# Patient Record
Sex: MALE | Race: WHITE | NOT HISPANIC OR LATINO | URBAN - METROPOLITAN AREA
[De-identification: names, ages, dates, MRNs, and addresses within clinical notes are randomized per-mention and may not be internally consistent; named-entity substitution may affect disease eponyms.]

---

## 2020-10-14 ENCOUNTER — OFFICE VISIT (OUTPATIENT)
Dept: URBAN - METROPOLITAN AREA CLINIC 63 | Facility: CLINIC | Age: 65
End: 2020-10-14

## 2021-04-05 ENCOUNTER — OFFICE VISIT (OUTPATIENT)
Dept: URBAN - METROPOLITAN AREA CLINIC 63 | Facility: CLINIC | Age: 66
End: 2021-04-05

## 2021-04-30 LAB
ABSOLUTE BASOPHILS: (no result)
ABSOLUTE EOSINOPHILS: (no result)
ABSOLUTE LYMPHOCYTES: (no result)
ABSOLUTE MONOCYTES: (no result)
ABSOLUTE NEUTROPHILS: (no result)
ALBUMIN/GLOBULIN RATIO: (no result)
ALBUMIN: (no result)
ALKALINE PHOSPHATASE: (no result)
ALT: (no result)
AST: (no result)
BASOPHILS: (no result)
BILIRUBIN, TOTAL: (no result)
BUN/CREATININE RATIO: (no result)
CALCIUM: (no result)
CARBON DIOXIDE: (no result)
CHLORIDE: (no result)
CREATININE: (no result)
EGFR AFRICAN AMERIC: (no result)
EGFR NON-AFR. AMERI: (no result)
EOSINOPHILS: (no result)
GLOBULIN: (no result)
GLUCOSE: (no result)
HEMATOCRIT: (no result)
HEMOGLOBIN: (no result)
INR: 1
LYMPHOCYTES: (no result)
MCH: (no result)
MCHC: (no result)
MCV: (no result)
MONOCYTES: (no result)
MPV: (no result)
NEUTROPHILS: (no result)
PLATELET COUNT: (no result)
POTASSIUM: (no result)
PROTEIN, TOTAL: (no result)
PT: (no result)
RDW: (no result)
RED BLOOD CELL COUNT: (no result)
SODIUM: (no result)
UREA NITROGEN (BUN): (no result)
WHITE BLOOD CELL COUNT: (no result)

## 2021-06-17 ENCOUNTER — OFFICE VISIT (OUTPATIENT)
Dept: URBAN - METROPOLITAN AREA TELEHEALTH 2 | Facility: TELEHEALTH | Age: 66
End: 2021-06-17

## 2022-03-21 ENCOUNTER — OFFICE VISIT (OUTPATIENT)
Dept: URBAN - METROPOLITAN AREA CLINIC 63 | Facility: CLINIC | Age: 67
End: 2022-03-21

## 2022-07-09 ENCOUNTER — TELEPHONE ENCOUNTER (OUTPATIENT)
Dept: URBAN - METROPOLITAN AREA CLINIC 121 | Facility: CLINIC | Age: 67
End: 2022-07-09

## 2022-07-09 RX ORDER — ALBUTEROL SULFATE 90 UG/1
AEROSOL, METERED RESPIRATORY (INHALATION) AS NEEDED
Refills: 0 | OUTPATIENT
Start: 2018-06-28 | End: 2018-09-27

## 2022-07-09 RX ORDER — ALBUTEROL SULFATE 90 UG/1
AEROSOL, METERED RESPIRATORY (INHALATION) AS NEEDED
Refills: 0 | OUTPATIENT
Start: 2017-02-23 | End: 2017-05-25

## 2022-07-09 RX ORDER — SODIUM CHLORIDE 1 G/1
TABLET ORAL
Refills: 0 | OUTPATIENT
Start: 2019-08-12 | End: 2021-04-05

## 2022-07-09 RX ORDER — ASPIRIN 81 MG/1
TABLET, DELAYED RELEASE ORAL ONCE A DAY
Refills: 0 | OUTPATIENT
Start: 2017-12-07 | End: 2018-06-28

## 2022-07-09 RX ORDER — ASPIRIN 81 MG/1
TABLET, DELAYED RELEASE ORAL ONCE A DAY
Refills: 0 | OUTPATIENT
Start: 2021-04-05 | End: 2022-03-21

## 2022-07-09 RX ORDER — SERTRALINE 100 MG/1
TABLET, FILM COATED ORAL ONCE A DAY
Refills: 0 | OUTPATIENT
Start: 2017-12-07 | End: 2018-06-28

## 2022-07-09 RX ORDER — LEVOTHYROXINE SODIUM 100 UG/1
TABLET ORAL ONCE A DAY
Refills: 0 | OUTPATIENT
Start: 2018-09-27 | End: 2019-08-12

## 2022-07-09 RX ORDER — FLUTICASONE PROPIONATE 50 UG/1
SPRAY, METERED NASAL AS NEEDED
Refills: 0 | OUTPATIENT
Start: 2017-05-25 | End: 2017-12-07

## 2022-07-09 RX ORDER — COLESTIPOL HYDROCHLORIDE 1 G/1
START ONCE A DAY- MAY INCREASE TO TWICE A DAY AFTER ONE WEEK. MUST TAKE 2 HOURS APART FROM OTHER MEDICATIONS TABLET, FILM COATED ORAL TWICE A DAY
Refills: 0 | OUTPATIENT
Start: 2021-04-05 | End: 2021-04-06

## 2022-07-09 RX ORDER — CALCIUM NO.38/D3/MAG/BORON ASP 500MG/15ML
LIQUID (ML) ORAL ONCE A DAY
Refills: 0 | OUTPATIENT
Start: 2017-05-25 | End: 2017-12-07

## 2022-07-09 RX ORDER — LOPERAMIDE HYDROCHLORIDE 2 MG/1
TWO TABLETS WITH FIRST LOOSE BOWEL MOVEMENT THEN 1 TABLET WITH EACH ADDITIONAL LOOSE BOWEL MOVEMENT CAPSULE ORAL
Refills: 1 | OUTPATIENT
Start: 2017-02-23 | End: 2018-09-27

## 2022-07-09 RX ORDER — AVOBENZONE, HOMOSALATE, OCTISALATE, OCTOCRYLENE 30; 100; 50; 100 MG/ML; MG/ML; MG/ML; MG/ML
LOTION TOPICAL ONCE A DAY
Refills: 0 | OUTPATIENT
Start: 2017-12-07 | End: 2018-06-28

## 2022-07-09 RX ORDER — ASPIRIN 81 MG/1
TABLET, DELAYED RELEASE ORAL ONCE A DAY
Refills: 0 | OUTPATIENT
Start: 2018-09-27 | End: 2019-08-12

## 2022-07-09 RX ORDER — MULTIVITAMIN
TABLET ORAL ONCE A DAY
Refills: 0 | OUTPATIENT
Start: 2017-05-25 | End: 2018-06-28

## 2022-07-09 RX ORDER — ASPIRIN 81 MG/1
TABLET, DELAYED RELEASE ORAL ONCE A DAY
Refills: 0 | OUTPATIENT
Start: 2017-05-25 | End: 2017-12-07

## 2022-07-09 RX ORDER — MULTIVITAMIN
TABLET ORAL ONCE A DAY
Refills: 0 | OUTPATIENT
Start: 2019-08-12 | End: 2021-04-05

## 2022-07-09 RX ORDER — CALCIUM NO.38/D3/MAG/BORON ASP 500MG/15ML
LIQUID (ML) ORAL ONCE A DAY
Refills: 0 | OUTPATIENT
Start: 2017-12-07 | End: 2018-06-28

## 2022-07-09 RX ORDER — MEMANTINE HYDROCHLORIDE 10 MG/1
TABLET, FILM COATED ORAL TWICE A DAY
Refills: 0 | OUTPATIENT
Start: 2018-09-27 | End: 2019-08-12

## 2022-07-09 RX ORDER — SERTRALINE 100 MG/1
TABLET, FILM COATED ORAL ONCE A DAY
Refills: 0 | OUTPATIENT
Start: 2017-02-23 | End: 2017-05-25

## 2022-07-09 RX ORDER — MEMANTINE HYDROCHLORIDE 10 MG/1
TABLET, FILM COATED ORAL TWICE A DAY
Refills: 0 | OUTPATIENT
Start: 2018-06-28 | End: 2018-06-28

## 2022-07-09 RX ORDER — AVOBENZONE, HOMOSALATE, OCTISALATE, OCTOCRYLENE 30; 100; 50; 100 MG/ML; MG/ML; MG/ML; MG/ML
LOTION TOPICAL ONCE A DAY
Refills: 0 | OUTPATIENT
Start: 2016-11-03 | End: 2017-02-23

## 2022-07-09 RX ORDER — FAMOTIDINE 10 MG
TAKE ONE TABLET BY MOUTH ONCE A DAY TABLET ORAL
Refills: 1 | OUTPATIENT
Start: 2019-01-14 | End: 2019-01-14

## 2022-07-09 RX ORDER — BISOPROLOL FUMARATE 10 MG/1
TABLET ORAL ONCE A DAY
Refills: 0 | OUTPATIENT
Start: 2018-06-28 | End: 2018-09-27

## 2022-07-09 RX ORDER — FAMOTIDINE 10 MG
TABLET ORAL
Refills: 0 | OUTPATIENT
Start: 2018-09-27 | End: 2019-01-14

## 2022-07-09 RX ORDER — BISOPROLOL FUMARATE 10 MG/1
TABLET ORAL ONCE A DAY
Refills: 0 | OUTPATIENT
Start: 2021-04-05 | End: 2022-03-21

## 2022-07-09 RX ORDER — MULTIVITAMIN
TABLET ORAL ONCE A DAY
Refills: 0 | OUTPATIENT
Start: 2016-09-16 | End: 2016-11-03

## 2022-07-09 RX ORDER — ALBUTEROL SULFATE 90 UG/1
AEROSOL, METERED RESPIRATORY (INHALATION) AS NEEDED
Refills: 0 | OUTPATIENT
Start: 2016-09-16 | End: 2016-11-03

## 2022-07-09 RX ORDER — SERTRALINE 100 MG/1
TABLET, FILM COATED ORAL ONCE A DAY
Refills: 0 | OUTPATIENT
Start: 2018-09-27 | End: 2019-08-12

## 2022-07-09 RX ORDER — MULTIVITAMIN
TABLET ORAL ONCE A DAY
Refills: 0 | OUTPATIENT
Start: 2021-04-05 | End: 2022-03-21

## 2022-07-09 RX ORDER — HYDROCORTISONE 5 MG/1
TABLET ORAL ONCE A DAY
Refills: 0 | OUTPATIENT
Start: 2017-02-23 | End: 2017-05-25

## 2022-07-09 RX ORDER — COLESTIPOL HYDROCHLORIDE 1 G/1
TAKE 1 TABLET BY MOUTH EVERY DAY FOR 1 WEEK TABLET, FILM COATED ORAL
Refills: 0 | OUTPATIENT
Start: 2021-09-15 | End: 2021-12-22

## 2022-07-09 RX ORDER — OMEPRAZOLE 40 MG/1
ONCE A DAY CAPSULE, DELAYED RELEASE ORAL ONCE A DAY
Refills: 3 | OUTPATIENT
Start: 2016-10-10 | End: 2018-09-27

## 2022-07-09 RX ORDER — MEMANTINE HYDROCHLORIDE 10 MG/1
TABLET, FILM COATED ORAL TWICE A DAY
Refills: 0 | OUTPATIENT
Start: 2018-06-28 | End: 2018-09-27

## 2022-07-09 RX ORDER — AVOBENZONE, HOMOSALATE, OCTISALATE, OCTOCRYLENE 30; 100; 50; 100 MG/ML; MG/ML; MG/ML; MG/ML
LOTION TOPICAL ONCE A DAY
Refills: 0 | OUTPATIENT
Start: 2017-02-23 | End: 2017-05-25

## 2022-07-09 RX ORDER — SODIUM CHLORIDE 1 G/1
TABLET ORAL
Refills: 0 | OUTPATIENT
Start: 2018-06-28 | End: 2018-09-27

## 2022-07-09 RX ORDER — MULTIVITAMIN
TABLET ORAL ONCE A DAY
Refills: 0 | OUTPATIENT
Start: 2017-02-23 | End: 2017-05-25

## 2022-07-09 RX ORDER — FLUDROCORTISONE ACETATE 0.1 MG/1
TABLET ORAL AS NEEDED
Refills: 0 | OUTPATIENT
Start: 2021-04-05 | End: 2022-03-21

## 2022-07-09 RX ORDER — ALBUTEROL SULFATE 90 UG/1
AEROSOL, METERED RESPIRATORY (INHALATION) AS NEEDED
Refills: 0 | OUTPATIENT
Start: 2017-05-25 | End: 2018-06-28

## 2022-07-09 RX ORDER — FINASTERIDE 5 MG/1
TABLET, FILM COATED ORAL ONCE A DAY
Refills: 0 | OUTPATIENT
Start: 2021-04-05 | End: 2022-03-21

## 2022-07-09 RX ORDER — SIMVASTATIN 20 MG/1
TABLET, FILM COATED ORAL ONCE A DAY
Refills: 0 | OUTPATIENT
Start: 2021-04-05 | End: 2022-03-21

## 2022-07-09 RX ORDER — OMEPRAZOLE 40 MG/1
ONCE A DAY CAPSULE, DELAYED RELEASE ORAL ONCE A DAY
Refills: 3 | OUTPATIENT
Start: 2017-02-07 | End: 2018-09-27

## 2022-07-09 RX ORDER — CALCIUM NO.38/D3/MAG/BORON ASP 500MG/15ML
LIQUID (ML) ORAL ONCE A DAY
Refills: 0 | OUTPATIENT
Start: 2018-09-27 | End: 2019-08-12

## 2022-07-09 RX ORDER — LEVOTHYROXINE SODIUM 100 UG/1
TABLET ORAL ONCE A DAY
Refills: 0 | OUTPATIENT
Start: 2017-12-07 | End: 2018-06-28

## 2022-07-09 RX ORDER — ALBUTEROL SULFATE 90 UG/1
AEROSOL, METERED RESPIRATORY (INHALATION) AS NEEDED
Refills: 0 | OUTPATIENT
Start: 2018-09-27 | End: 2019-08-12

## 2022-07-09 RX ORDER — FLUTICASONE PROPIONATE 50 UG/1
SPRAY, METERED NASAL AS NEEDED
Refills: 0 | OUTPATIENT
Start: 2017-02-23 | End: 2017-05-25

## 2022-07-09 RX ORDER — AVOBENZONE, HOMOSALATE, OCTISALATE, OCTOCRYLENE 30; 100; 50; 100 MG/ML; MG/ML; MG/ML; MG/ML
LOTION TOPICAL ONCE A DAY
Refills: 0 | OUTPATIENT
Start: 2019-08-12 | End: 2021-04-05

## 2022-07-09 RX ORDER — LEVOTHYROXINE SODIUM 100 UG/1
TABLET ORAL ONCE A DAY
Refills: 0 | OUTPATIENT
Start: 2021-04-05 | End: 2022-03-21

## 2022-07-09 RX ORDER — OMEPRAZOLE 40 MG/1
TAKE ONE CAPSULE BY MOUTH ONCE A DAY CAPSULE, DELAYED RELEASE ORAL
Refills: 0 | OUTPATIENT
Start: 2019-07-19 | End: 2019-11-07

## 2022-07-09 RX ORDER — MEMANTINE HYDROCHLORIDE 10 MG/1
TABLET, FILM COATED ORAL TWICE A DAY
Refills: 0 | OUTPATIENT
Start: 2019-08-12 | End: 2021-04-05

## 2022-07-09 RX ORDER — CLONIDINE 0.2 MG/24H
PATCH, EXTENDED RELEASE TRANSDERMAL
Refills: 0 | OUTPATIENT
Start: 2021-04-05 | End: 2022-03-21

## 2022-07-09 RX ORDER — ALBUTEROL SULFATE 90 UG/1
AEROSOL, METERED RESPIRATORY (INHALATION) AS NEEDED
Refills: 0 | OUTPATIENT
Start: 2016-11-03 | End: 2017-02-23

## 2022-07-09 RX ORDER — SERTRALINE 100 MG/1
TABLET, FILM COATED ORAL ONCE A DAY
Refills: 0 | OUTPATIENT
Start: 2016-11-03 | End: 2017-02-23

## 2022-07-09 RX ORDER — HYDROCORTISONE 5 MG/1
TABLET ORAL ONCE A DAY
Refills: 0 | OUTPATIENT
Start: 2016-09-16 | End: 2016-11-03

## 2022-07-09 RX ORDER — ASPIRIN 81 MG/1
TABLET, DELAYED RELEASE ORAL ONCE A DAY
Refills: 0 | OUTPATIENT
Start: 2019-08-12 | End: 2021-04-05

## 2022-07-09 RX ORDER — BISOPROLOL FUMARATE 10 MG/1
TABLET ORAL ONCE A DAY
Refills: 0 | OUTPATIENT
Start: 2019-08-12 | End: 2021-04-05

## 2022-07-09 RX ORDER — SODIUM CHLORIDE 1 G/1
TABLET ORAL
Refills: 0 | OUTPATIENT
Start: 2017-02-23 | End: 2017-05-25

## 2022-07-09 RX ORDER — MEMANTINE HYDROCHLORIDE 10 MG/1
TABLET, FILM COATED ORAL TWICE A DAY
Refills: 0 | OUTPATIENT
Start: 2021-04-05 | End: 2022-03-21

## 2022-07-09 RX ORDER — CALCIUM NO.38/D3/MAG/BORON ASP 500MG/15ML
LIQUID (ML) ORAL ONCE A DAY
Refills: 0 | OUTPATIENT
Start: 2018-06-28 | End: 2018-09-27

## 2022-07-09 RX ORDER — CALCIUM NO.38/D3/MAG/BORON ASP 500MG/15ML
LIQUID (ML) ORAL ONCE A DAY
Refills: 0 | OUTPATIENT
Start: 2016-11-03 | End: 2017-02-23

## 2022-07-09 RX ORDER — OMEGA-3S/DHA/EPA/FISH OIL 980-1400MG
CAPSULE,DELAYED RELEASE (ENTERIC COATED) ORAL ONCE A DAY
Refills: 0 | OUTPATIENT
Start: 2017-02-23 | End: 2017-05-25

## 2022-07-09 RX ORDER — OMEPRAZOLE 40 MG/1
ONCE A DAY CAPSULE, DELAYED RELEASE ORAL ONCE A DAY
Refills: 1 | OUTPATIENT
Start: 2019-02-04 | End: 2019-07-19

## 2022-07-09 RX ORDER — OMEPRAZOLE 40 MG/1
TAKE ONE CAPSULE BY MOUTH ONCE A DAY CAPSULE, DELAYED RELEASE ORAL
Refills: 1 | OUTPATIENT
Start: 2021-06-17 | End: 2021-12-20

## 2022-07-09 RX ORDER — LEVOTHYROXINE SODIUM 100 UG/1
TABLET ORAL ONCE A DAY
Refills: 0 | OUTPATIENT
Start: 2017-02-23 | End: 2017-05-25

## 2022-07-09 RX ORDER — SODIUM CHLORIDE 1 G/1
TABLET ORAL
Refills: 0 | OUTPATIENT
Start: 2021-04-05 | End: 2022-03-21

## 2022-07-09 RX ORDER — ASPIRIN 81 MG/1
TABLET, DELAYED RELEASE ORAL ONCE A DAY
Refills: 0 | OUTPATIENT
Start: 2018-06-28 | End: 2018-09-27

## 2022-07-09 RX ORDER — AVOBENZONE, HOMOSALATE, OCTISALATE, OCTOCRYLENE 30; 100; 50; 100 MG/ML; MG/ML; MG/ML; MG/ML
LOTION TOPICAL ONCE A DAY
Refills: 0 | OUTPATIENT
Start: 2018-09-27 | End: 2019-08-12

## 2022-07-09 RX ORDER — PANCRELIPASE LIPASE, PANCRELIPASE PROTEASE, PANCRELIPASE AMYLASE 25000; 79000; 105000 [USP'U]/1; [USP'U]/1; [USP'U]/1
TAKE TWO TABLETS WITH EACH MEAL AND ONE WITH SNACK (8 TABLETS DAILY) CAPSULE, DELAYED RELEASE ORAL
Refills: 2 | OUTPATIENT
Start: 2018-10-02 | End: 2019-08-12

## 2022-07-09 RX ORDER — SODIUM CHLORIDE 1 G/1
TABLET ORAL
Refills: 0 | OUTPATIENT
Start: 2017-05-25 | End: 2018-06-28

## 2022-07-09 RX ORDER — AVOBENZONE, HOMOSALATE, OCTISALATE, OCTOCRYLENE 30; 100; 50; 100 MG/ML; MG/ML; MG/ML; MG/ML
LOTION TOPICAL ONCE A DAY
Refills: 0 | OUTPATIENT
Start: 2018-06-28 | End: 2018-09-27

## 2022-07-09 RX ORDER — ASPIRIN 81 MG/1
TABLET, DELAYED RELEASE ORAL ONCE A DAY
Refills: 0 | OUTPATIENT
Start: 2017-02-23 | End: 2017-05-25

## 2022-07-09 RX ORDER — ASPIRIN 81 MG/1
TABLET, DELAYED RELEASE ORAL ONCE A DAY
Refills: 0 | OUTPATIENT
Start: 2016-09-16 | End: 2016-11-03

## 2022-07-09 RX ORDER — ASPIRIN 81 MG/1
TABLET, DELAYED RELEASE ORAL ONCE A DAY
Refills: 0 | OUTPATIENT
Start: 2016-11-03 | End: 2017-02-23

## 2022-07-09 RX ORDER — OMEGA-3S/DHA/EPA/FISH OIL 980-1400MG
CAPSULE,DELAYED RELEASE (ENTERIC COATED) ORAL ONCE A DAY
Refills: 0 | OUTPATIENT
Start: 2016-11-03 | End: 2017-02-23

## 2022-07-09 RX ORDER — LEVOTHYROXINE SODIUM 100 UG/1
TABLET ORAL ONCE A DAY
Refills: 0 | OUTPATIENT
Start: 2016-09-16 | End: 2016-11-03

## 2022-07-09 RX ORDER — AVOBENZONE, HOMOSALATE, OCTISALATE, OCTOCRYLENE 30; 100; 50; 100 MG/ML; MG/ML; MG/ML; MG/ML
LOTION TOPICAL ONCE A DAY
Refills: 0 | OUTPATIENT
Start: 2021-04-05 | End: 2022-03-21

## 2022-07-09 RX ORDER — LEVOTHYROXINE SODIUM 100 UG/1
TABLET ORAL ONCE A DAY
Refills: 0 | OUTPATIENT
Start: 2018-06-28 | End: 2018-09-27

## 2022-07-09 RX ORDER — FLUDROCORTISONE ACETATE 0.1 MG/1
TABLET ORAL
Refills: 0 | OUTPATIENT
Start: 2019-08-12 | End: 2021-04-05

## 2022-07-09 RX ORDER — DIETHYLTOLUAMIDE 7 %
SPRAY, NON-AEROSOL (ML) TOPICAL ONCE A DAY
Refills: 0 | OUTPATIENT
Start: 2021-04-05 | End: 2022-03-21

## 2022-07-09 RX ORDER — COLESTIPOL HYDROCHLORIDE 1 G/1
TAKE 1 TABLET BY MOUTH EVERY DAY FOR 1 WEEK TABLET, FILM COATED ORAL
Refills: 0 | OUTPATIENT
Start: 2021-06-17 | End: 2021-09-15

## 2022-07-09 RX ORDER — SERTRALINE 100 MG/1
TABLET, FILM COATED ORAL ONCE A DAY
Refills: 0 | OUTPATIENT
Start: 2018-06-28 | End: 2018-09-27

## 2022-07-09 RX ORDER — CALCIUM NO.38/D3/MAG/BORON ASP 500MG/15ML
LIQUID (ML) ORAL ONCE A DAY
Refills: 0 | OUTPATIENT
Start: 2016-09-16 | End: 2016-11-03

## 2022-07-09 RX ORDER — MULTIVITAMIN
TABLET ORAL ONCE A DAY
Refills: 0 | OUTPATIENT
Start: 2018-09-27 | End: 2019-08-12

## 2022-07-09 RX ORDER — FLUTICASONE PROPIONATE 50 UG/1
SPRAY, METERED NASAL AS NEEDED
Refills: 0 | OUTPATIENT
Start: 2016-11-03 | End: 2017-02-23

## 2022-07-09 RX ORDER — CALCIUM NO.38/D3/MAG/BORON ASP 500MG/15ML
LIQUID (ML) ORAL ONCE A DAY
Refills: 0 | OUTPATIENT
Start: 2017-02-23 | End: 2017-05-25

## 2022-07-09 RX ORDER — LEVOTHYROXINE SODIUM 100 UG/1
TABLET ORAL ONCE A DAY
Refills: 0 | OUTPATIENT
Start: 2016-11-03 | End: 2017-02-23

## 2022-07-09 RX ORDER — METOPROLOL SUCCINATE 25 MG/1
TABLET, EXTENDED RELEASE ORAL TWICE A DAY
Refills: 0 | OUTPATIENT
Start: 2021-04-05 | End: 2022-03-21

## 2022-07-09 RX ORDER — ALBUTEROL SULFATE 90 UG/1
AEROSOL, METERED RESPIRATORY (INHALATION) AS NEEDED
Refills: 0 | OUTPATIENT
Start: 2021-04-05 | End: 2022-03-21

## 2022-07-09 RX ORDER — SERTRALINE 100 MG/1
TABLET, FILM COATED ORAL ONCE A DAY
Refills: 0 | OUTPATIENT
Start: 2019-08-12 | End: 2021-04-05

## 2022-07-09 RX ORDER — COLESTIPOL HYDROCHLORIDE 1 G/1
TAKE 1  TO TWO TABLETS DAILY TABLET, FILM COATED ORAL
Refills: 0 | OUTPATIENT
Start: 2021-12-22 | End: 2022-03-21

## 2022-07-09 RX ORDER — SODIUM CHLORIDE 1 G/1
TABLET ORAL
Refills: 0 | OUTPATIENT
Start: 2016-11-03 | End: 2017-02-23

## 2022-07-09 RX ORDER — SERTRALINE 100 MG/1
TABLET, FILM COATED ORAL ONCE A DAY
Refills: 0 | OUTPATIENT
Start: 2017-05-25 | End: 2017-12-07

## 2022-07-09 RX ORDER — COLESTIPOL HYDROCHLORIDE 1 G/1
TAKE 1 TABLET BY MOUTH EVERY DAY FOR 1 WEEK. MAY INCREASE TO TWICE DAILY AFTER TABLET, FILM COATED ORAL
Refills: 0 | OUTPATIENT
Start: 2021-04-06 | End: 2021-06-17

## 2022-07-09 RX ORDER — SODIUM CHLORIDE 1 G/1
TABLET ORAL
Refills: 0 | OUTPATIENT
Start: 2016-09-16 | End: 2016-11-03

## 2022-07-09 RX ORDER — MELOXICAM 15 MG/1
TABLET ORAL ONCE A DAY
Refills: 0 | OUTPATIENT
Start: 2021-04-05 | End: 2022-03-21

## 2022-07-09 RX ORDER — OMEGA-3S/DHA/EPA/FISH OIL 980-1400MG
CAPSULE,DELAYED RELEASE (ENTERIC COATED) ORAL ONCE A DAY
Refills: 0 | OUTPATIENT
Start: 2016-09-16 | End: 2016-11-03

## 2022-07-09 RX ORDER — BISOPROLOL FUMARATE 10 MG/1
TABLET ORAL ONCE A DAY
Refills: 0 | OUTPATIENT
Start: 2017-12-07 | End: 2018-06-28

## 2022-07-09 RX ORDER — MULTIVITAMIN
TABLET ORAL ONCE A DAY
Refills: 0 | OUTPATIENT
Start: 2016-11-03 | End: 2017-02-23

## 2022-07-09 RX ORDER — MULTIVITAMIN
TABLET ORAL ONCE A DAY
Refills: 0 | OUTPATIENT
Start: 2018-06-28 | End: 2018-09-27

## 2022-07-09 RX ORDER — SERTRALINE 100 MG/1
TABLET, FILM COATED ORAL ONCE A DAY
Refills: 0 | OUTPATIENT
Start: 2016-09-16 | End: 2016-11-03

## 2022-07-09 RX ORDER — OMEPRAZOLE 40 MG/1
ONCE A DAY CAPSULE, DELAYED RELEASE ORAL ONCE A DAY
Refills: 2 | OUTPATIENT
Start: 2021-04-05 | End: 2022-03-21

## 2022-07-09 RX ORDER — LEVOTHYROXINE SODIUM 100 UG/1
TABLET ORAL ONCE A DAY
Refills: 0 | OUTPATIENT
Start: 2019-08-12 | End: 2021-04-05

## 2022-07-09 RX ORDER — CALCIUM NO.38/D3/MAG/BORON ASP 500MG/15ML
LIQUID (ML) ORAL ONCE A DAY
Refills: 0 | OUTPATIENT
Start: 2019-08-12 | End: 2021-04-05

## 2022-07-09 RX ORDER — HYDROCORTISONE 5 MG/1
TABLET ORAL ONCE A DAY
Refills: 0 | OUTPATIENT
Start: 2017-05-25 | End: 2018-06-28

## 2022-07-09 RX ORDER — BISOPROLOL FUMARATE 10 MG/1
TABLET ORAL ONCE A DAY
Refills: 0 | OUTPATIENT
Start: 2018-09-27 | End: 2019-08-12

## 2022-07-09 RX ORDER — AVOBENZONE, HOMOSALATE, OCTISALATE, OCTOCRYLENE 30; 100; 50; 100 MG/ML; MG/ML; MG/ML; MG/ML
LOTION TOPICAL ONCE A DAY
Refills: 0 | OUTPATIENT
Start: 2017-05-25 | End: 2017-12-07

## 2022-07-09 RX ORDER — OMEGA-3S/DHA/EPA/FISH OIL 980-1400MG
CAPSULE,DELAYED RELEASE (ENTERIC COATED) ORAL ONCE A DAY
Refills: 0 | OUTPATIENT
Start: 2017-05-25 | End: 2018-06-28

## 2022-07-09 RX ORDER — DIMENHYDRINATE 50 MG
TABLET ORAL ONCE A DAY
Refills: 0 | OUTPATIENT
Start: 2021-04-05 | End: 2022-03-21

## 2022-07-09 RX ORDER — OMEPRAZOLE 40 MG/1
TAKE ONE CAPSULE BY MOUTH ONCE A DAY CAPSULE, DELAYED RELEASE ORAL
Refills: 0 | OUTPATIENT
Start: 2019-11-07 | End: 2021-06-17

## 2022-07-09 RX ORDER — HYDROCORTISONE 5 MG/1
TABLET ORAL ONCE A DAY
Refills: 0 | OUTPATIENT
Start: 2018-06-28 | End: 2018-09-27

## 2022-07-09 RX ORDER — ALBUTEROL SULFATE 90 UG/1
AEROSOL, METERED RESPIRATORY (INHALATION) AS NEEDED
Refills: 0 | OUTPATIENT
Start: 2019-08-12 | End: 2021-04-05

## 2022-07-09 RX ORDER — LEVOTHYROXINE SODIUM 100 UG/1
TABLET ORAL ONCE A DAY
Refills: 0 | OUTPATIENT
Start: 2017-05-25 | End: 2017-12-07

## 2022-07-09 RX ORDER — PROPRANOLOL HYDROCHLORIDE 10 MG/1
TABLET ORAL ONCE A DAY
Refills: 0 | OUTPATIENT
Start: 2021-04-05 | End: 2022-03-21

## 2022-07-09 RX ORDER — SODIUM CHLORIDE 1 G/1
TABLET ORAL
Refills: 0 | OUTPATIENT
Start: 2018-09-27 | End: 2019-08-12

## 2022-07-09 RX ORDER — HYDROCORTISONE 5 MG/1
TABLET ORAL ONCE A DAY
Refills: 0 | OUTPATIENT
Start: 2016-11-03 | End: 2017-02-23

## 2022-07-09 RX ORDER — AMINO ACIDS/MV,IRON,MIN
TABLET ORAL TWICE A DAY
Refills: 0 | OUTPATIENT
Start: 2021-04-05 | End: 2022-03-21

## 2022-07-10 ENCOUNTER — TELEPHONE ENCOUNTER (OUTPATIENT)
Dept: URBAN - METROPOLITAN AREA CLINIC 121 | Facility: CLINIC | Age: 67
End: 2022-07-10

## 2022-07-10 RX ORDER — ASPIRIN 81 MG/1
TABLET, DELAYED RELEASE ORAL ONCE A DAY
Refills: 0 | Status: ACTIVE | COMMUNITY
Start: 2022-03-21

## 2022-07-10 RX ORDER — ALBUTEROL SULFATE 90 UG/1
AEROSOL, METERED RESPIRATORY (INHALATION) AS NEEDED
Refills: 0 | Status: ACTIVE | COMMUNITY
Start: 2022-03-21

## 2022-07-10 RX ORDER — BUPROPION HYDROCHLORIDE 200 MG/1
TABLET, FILM COATED, EXTENDED RELEASE ORAL ONCE A DAY
Refills: 0 | Status: ACTIVE | COMMUNITY
Start: 2022-03-21

## 2022-07-10 RX ORDER — OMEPRAZOLE 40 MG/1
ONCE A DAY CAPSULE, DELAYED RELEASE ORAL ONCE A DAY
Refills: 3 | Status: ACTIVE | COMMUNITY
Start: 2022-03-21

## 2022-07-10 RX ORDER — SIMVASTATIN 20 MG/1
TABLET, FILM COATED ORAL ONCE A DAY
Refills: 0 | Status: ACTIVE | COMMUNITY
Start: 2022-03-21

## 2022-07-10 RX ORDER — LEVOTHYROXINE SODIUM 100 UG/1
TABLET ORAL ONCE A DAY
Refills: 0 | Status: ACTIVE | COMMUNITY
Start: 2022-03-21

## 2022-07-10 RX ORDER — FAMOTIDINE 10 MG
TAKE ONE TABLET BY MOUTH ONCE A DAY TABLET ORAL
Refills: 1 | Status: ACTIVE | COMMUNITY
Start: 2019-01-14

## 2022-07-10 RX ORDER — OMEPRAZOLE 40 MG/1
ONCE A DAY CAPSULE, DELAYED RELEASE ORAL ONCE A DAY
Refills: 0 | Status: ACTIVE | COMMUNITY
Start: 2021-12-20

## 2022-07-10 RX ORDER — DIMENHYDRINATE 50 MG
TABLET ORAL ONCE A DAY
Refills: 0 | Status: ACTIVE | COMMUNITY
Start: 2022-03-21

## 2022-07-10 RX ORDER — MULTIVITAMIN
TABLET ORAL ONCE A DAY
Refills: 0 | Status: ACTIVE | COMMUNITY
Start: 2022-03-21

## 2022-07-10 RX ORDER — AMINO ACIDS/MV,IRON,MIN
TABLET ORAL TWICE A DAY
Refills: 0 | Status: ACTIVE | COMMUNITY
Start: 2022-03-21

## 2022-07-10 RX ORDER — BISOPROLOL FUMARATE 10 MG/1
TABLET ORAL ONCE A DAY
Refills: 0 | Status: ACTIVE | COMMUNITY
Start: 2022-03-21

## 2022-07-10 RX ORDER — SODIUM CHLORIDE 1 G/1
TABLET ORAL ONCE A DAY
Refills: 0 | Status: ACTIVE | COMMUNITY
Start: 2022-03-21

## 2022-07-10 RX ORDER — FLUDROCORTISONE ACETATE 0.1 MG/1
TABLET ORAL ONCE A DAY
Refills: 0 | Status: ACTIVE | COMMUNITY
Start: 2022-03-21

## 2022-07-10 RX ORDER — CLONIDINE 0.2 MG/24H
PATCH, EXTENDED RELEASE TRANSDERMAL
Refills: 0 | Status: ACTIVE | COMMUNITY
Start: 2022-03-21

## 2022-07-10 RX ORDER — MEMANTINE HYDROCHLORIDE 10 MG/1
TABLET, FILM COATED ORAL TWICE A DAY
Refills: 0 | Status: ACTIVE | COMMUNITY
Start: 2022-03-21

## 2022-07-10 RX ORDER — AVOBENZONE, HOMOSALATE, OCTISALATE, OCTOCRYLENE 30; 100; 50; 100 MG/ML; MG/ML; MG/ML; MG/ML
LOTION TOPICAL ONCE A DAY
Refills: 0 | Status: ACTIVE | COMMUNITY
Start: 2022-03-21

## 2023-05-30 ENCOUNTER — TELEPHONE ENCOUNTER (OUTPATIENT)
Dept: URBAN - METROPOLITAN AREA CLINIC 7 | Facility: CLINIC | Age: 68
End: 2023-05-30

## 2023-05-30 RX ORDER — OMEPRAZOLE 40 MG/1
ONCE A DAY CAPSULE, DELAYED RELEASE ORAL ONCE A DAY
Qty: 30 | Refills: 1
Start: 2022-03-21

## 2023-06-13 ENCOUNTER — OFFICE VISIT (OUTPATIENT)
Dept: URBAN - METROPOLITAN AREA CLINIC 63 | Facility: CLINIC | Age: 68
End: 2023-06-13
Payer: MEDICARE

## 2023-06-13 ENCOUNTER — WEB ENCOUNTER (OUTPATIENT)
Dept: URBAN - METROPOLITAN AREA CLINIC 63 | Facility: CLINIC | Age: 68
End: 2023-06-13

## 2023-06-13 VITALS
HEIGHT: 70 IN | TEMPERATURE: 97.4 F | HEART RATE: 70 BPM | WEIGHT: 201.6 LBS | DIASTOLIC BLOOD PRESSURE: 80 MMHG | OXYGEN SATURATION: 96 % | BODY MASS INDEX: 28.86 KG/M2 | SYSTOLIC BLOOD PRESSURE: 130 MMHG

## 2023-06-13 DIAGNOSIS — K21.9 GASTROESOPHAGEAL REFLUX DISEASE WITHOUT ESOPHAGITIS: ICD-10-CM

## 2023-06-13 DIAGNOSIS — G90.A POTS (POSTURAL ORTHOSTATIC TACHYCARDIA SYNDROME): ICD-10-CM

## 2023-06-13 DIAGNOSIS — R19.7 ACUTE DIARRHEA: ICD-10-CM

## 2023-06-13 DIAGNOSIS — K76.0 FATTY LIVER: ICD-10-CM

## 2023-06-13 PROBLEM — 197321007: Status: ACTIVE | Noted: 2023-06-13

## 2023-06-13 PROBLEM — 266435005: Status: ACTIVE | Noted: 2023-06-13

## 2023-06-13 PROCEDURE — 99214 OFFICE O/P EST MOD 30 MIN: CPT | Performed by: NURSE PRACTITIONER

## 2023-06-13 RX ORDER — MEMANTINE HYDROCHLORIDE 10 MG/1
TABLET, FILM COATED ORAL TWICE A DAY
Refills: 0 | Status: ACTIVE | COMMUNITY
Start: 2022-03-21

## 2023-06-13 RX ORDER — ALBUTEROL SULFATE 90 UG/1
AEROSOL, METERED RESPIRATORY (INHALATION) AS NEEDED
Refills: 0 | Status: ACTIVE | COMMUNITY
Start: 2022-03-21

## 2023-06-13 RX ORDER — SODIUM CHLORIDE 1 G/1
TABLET ORAL ONCE A DAY
Refills: 0 | Status: ACTIVE | COMMUNITY
Start: 2022-03-21

## 2023-06-13 RX ORDER — ASPIRIN 81 MG/1
TABLET, DELAYED RELEASE ORAL ONCE A DAY
Refills: 0 | Status: ACTIVE | COMMUNITY
Start: 2022-03-21

## 2023-06-13 RX ORDER — CLONIDINE 0.2 MG/24H
PATCH, EXTENDED RELEASE TRANSDERMAL
Refills: 0 | Status: ACTIVE | COMMUNITY
Start: 2022-03-21

## 2023-06-13 RX ORDER — LEVOTHYROXINE SODIUM 100 UG/1
TABLET ORAL ONCE A DAY
Refills: 0 | Status: ACTIVE | COMMUNITY
Start: 2022-03-21

## 2023-06-13 RX ORDER — AMINO ACIDS/MV,IRON,MIN
TABLET ORAL TWICE A DAY
Refills: 0 | Status: ACTIVE | COMMUNITY
Start: 2022-03-21

## 2023-06-13 RX ORDER — OMEPRAZOLE 40 MG/1
1 CAPSULE 30 MINUTES BEFORE MORNING MEAL CAPSULE, DELAYED RELEASE ORAL ONCE A DAY
Qty: 90 | Refills: 3 | OUTPATIENT
Start: 2023-06-13

## 2023-06-13 RX ORDER — BUPROPION HYDROCHLORIDE 200 MG/1
TABLET, FILM COATED, EXTENDED RELEASE ORAL ONCE A DAY
Refills: 0 | Status: ACTIVE | COMMUNITY
Start: 2022-03-21

## 2023-06-13 RX ORDER — OMEPRAZOLE 40 MG/1
ONCE A DAY CAPSULE, DELAYED RELEASE ORAL ONCE A DAY
Qty: 30 | Refills: 1 | Status: ACTIVE | COMMUNITY
Start: 2022-03-21

## 2023-06-13 RX ORDER — DIMENHYDRINATE 50 MG
TABLET ORAL ONCE A DAY
Refills: 0 | Status: ACTIVE | COMMUNITY
Start: 2022-03-21

## 2023-06-13 RX ORDER — FLUDROCORTISONE ACETATE 0.1 MG/1
TABLET ORAL ONCE A DAY
Refills: 0 | Status: ACTIVE | COMMUNITY
Start: 2022-03-21

## 2023-06-13 RX ORDER — BISOPROLOL FUMARATE 10 MG/1
TABLET ORAL ONCE A DAY
Refills: 0 | Status: ACTIVE | COMMUNITY
Start: 2022-03-21

## 2023-06-13 RX ORDER — MULTIVITAMIN
TABLET ORAL ONCE A DAY
Refills: 0 | Status: ACTIVE | COMMUNITY
Start: 2022-03-21

## 2023-06-13 RX ORDER — AVOBENZONE, HOMOSALATE, OCTISALATE, OCTOCRYLENE 30; 100; 50; 100 MG/ML; MG/ML; MG/ML; MG/ML
LOTION TOPICAL ONCE A DAY
Refills: 0 | Status: ACTIVE | COMMUNITY
Start: 2022-03-21

## 2023-06-13 NOTE — HPI-TODAY'S VISIT:
Here for a refill of omeprazole 40mg once daily for his reflux. Symptoms well controlled if he takes the omeprazole but return if he stops it for very long.  Reports a bout of diarrhea since starting abt for sinus infection 2 weeks ago but this is improving spontaneously since finishing the abt.  Also had a fibroscan showing S1/F0 in 2021, should repeat

## 2023-06-13 NOTE — HPI-PREVIOUS PROCEDURES
EGD 10/10/2016 - LA Grade A reflux esophagitis. 2cm hiatus hernia. mild Portal hypertensive gastropathy, non bleeding erosive gastropathy, 5mm non bleeding gastric ulcer w/ no stigmata of bleeding - benign gastric antral and fundic mucosa with mild congestion, negative for h pylori. Duodenal erosions w/o bleeding. Normal 2nd part of the duodenum - biopsies unremarkable  Colonoscopy 10/10/2016 - Good prep - Grade 2 hemorrhoids. Normal colon - biopsies unremarkable. Normal TI with unremarkable biopsies

## 2023-08-14 ENCOUNTER — OFFICE VISIT (OUTPATIENT)
Dept: URBAN - METROPOLITAN AREA CLINIC 63 | Facility: CLINIC | Age: 68
End: 2023-08-14
Payer: MEDICARE

## 2023-08-14 ENCOUNTER — DASHBOARD ENCOUNTERS (OUTPATIENT)
Age: 68
End: 2023-08-14

## 2023-08-14 ENCOUNTER — WEB ENCOUNTER (OUTPATIENT)
Dept: URBAN - METROPOLITAN AREA CLINIC 63 | Facility: CLINIC | Age: 68
End: 2023-08-14

## 2023-08-14 VITALS
BODY MASS INDEX: 28.43 KG/M2 | TEMPERATURE: 97.1 F | DIASTOLIC BLOOD PRESSURE: 78 MMHG | HEIGHT: 70 IN | WEIGHT: 198.6 LBS | HEART RATE: 66 BPM | OXYGEN SATURATION: 96 % | SYSTOLIC BLOOD PRESSURE: 120 MMHG

## 2023-08-14 DIAGNOSIS — K76.0 FATTY LIVER: ICD-10-CM

## 2023-08-14 DIAGNOSIS — R12 HEARTBURN: ICD-10-CM

## 2023-08-14 PROCEDURE — 99213 OFFICE O/P EST LOW 20 MIN: CPT | Performed by: NURSE PRACTITIONER

## 2023-08-14 RX ORDER — DIMENHYDRINATE 50 MG
TABLET ORAL ONCE A DAY
Refills: 0 | Status: ACTIVE | COMMUNITY
Start: 2022-03-21

## 2023-08-14 RX ORDER — BUPROPION HYDROCHLORIDE 200 MG/1
TABLET, FILM COATED, EXTENDED RELEASE ORAL ONCE A DAY
Refills: 0 | Status: ACTIVE | COMMUNITY
Start: 2022-03-21

## 2023-08-14 RX ORDER — MEMANTINE HYDROCHLORIDE 10 MG/1
TABLET, FILM COATED ORAL TWICE A DAY
Refills: 0 | Status: ACTIVE | COMMUNITY
Start: 2022-03-21

## 2023-08-14 RX ORDER — SODIUM CHLORIDE 1 G/1
TABLET ORAL ONCE A DAY
Refills: 0 | Status: ACTIVE | COMMUNITY
Start: 2022-03-21

## 2023-08-14 RX ORDER — AMINO ACIDS/MV,IRON,MIN
TABLET ORAL TWICE A DAY
Refills: 0 | Status: ACTIVE | COMMUNITY
Start: 2022-03-21

## 2023-08-14 RX ORDER — AVOBENZONE, HOMOSALATE, OCTISALATE, OCTOCRYLENE 30; 100; 50; 100 MG/ML; MG/ML; MG/ML; MG/ML
LOTION TOPICAL ONCE A DAY
Refills: 0 | Status: ACTIVE | COMMUNITY
Start: 2022-03-21

## 2023-08-14 RX ORDER — CLONIDINE 0.2 MG/24H
PATCH, EXTENDED RELEASE TRANSDERMAL
Refills: 0 | Status: ACTIVE | COMMUNITY
Start: 2022-03-21

## 2023-08-14 RX ORDER — OMEPRAZOLE 40 MG/1
1 CAPSULE 30 MINUTES BEFORE MORNING MEAL CAPSULE, DELAYED RELEASE ORAL ONCE A DAY
Qty: 90 | Refills: 3 | Status: ACTIVE | COMMUNITY
Start: 2023-06-13

## 2023-08-14 RX ORDER — FLUDROCORTISONE ACETATE 0.1 MG/1
TABLET ORAL ONCE A DAY
Refills: 0 | Status: ACTIVE | COMMUNITY
Start: 2022-03-21

## 2023-08-14 RX ORDER — ASPIRIN 81 MG/1
TABLET, DELAYED RELEASE ORAL ONCE A DAY
Refills: 0 | Status: ACTIVE | COMMUNITY
Start: 2022-03-21

## 2023-08-14 RX ORDER — OMEPRAZOLE 40 MG/1
ONCE A DAY CAPSULE, DELAYED RELEASE ORAL ONCE A DAY
Qty: 30 | Refills: 1 | Status: ACTIVE | COMMUNITY
Start: 2022-03-21

## 2023-08-14 RX ORDER — ALBUTEROL SULFATE 90 UG/1
AEROSOL, METERED RESPIRATORY (INHALATION) AS NEEDED
Refills: 0 | Status: ACTIVE | COMMUNITY
Start: 2022-03-21

## 2023-08-14 RX ORDER — LEVOTHYROXINE SODIUM 100 UG/1
TABLET ORAL ONCE A DAY
Refills: 0 | Status: ACTIVE | COMMUNITY
Start: 2022-03-21

## 2023-08-14 RX ORDER — BISOPROLOL FUMARATE 10 MG/1
TABLET ORAL ONCE A DAY
Refills: 0 | Status: ACTIVE | COMMUNITY
Start: 2022-03-21

## 2023-08-14 RX ORDER — MULTIVITAMIN
TABLET ORAL ONCE A DAY
Refills: 0 | Status: ACTIVE | COMMUNITY
Start: 2022-03-21

## 2023-08-14 NOTE — HPI-TODAY'S VISIT:
Heartburn well controlled on omeprazole 40mg in the evening. Last egd in 2016 negative for barretts, colonoscopy in 2016 negative for polyps

## 2024-05-10 ENCOUNTER — TELEPHONE ENCOUNTER (OUTPATIENT)
Dept: URBAN - METROPOLITAN AREA CLINIC 63 | Facility: CLINIC | Age: 69
End: 2024-05-10

## 2024-05-10 RX ORDER — OMEPRAZOLE 40 MG/1
1 CAPSULE 30 MINUTES BEFORE MORNING MEAL CAPSULE, DELAYED RELEASE ORAL ONCE A DAY
Qty: 90 | Refills: 3
Start: 2023-06-13

## 2024-05-15 ENCOUNTER — P2P PATIENT RECORD (OUTPATIENT)
Age: 69
End: 2024-05-15

## 2024-07-31 ENCOUNTER — OFFICE VISIT (OUTPATIENT)
Dept: URBAN - METROPOLITAN AREA CLINIC 63 | Facility: CLINIC | Age: 69
End: 2024-07-31
Payer: MEDICARE

## 2024-07-31 VITALS
SYSTOLIC BLOOD PRESSURE: 138 MMHG | HEART RATE: 70 BPM | WEIGHT: 199.8 LBS | TEMPERATURE: 97.9 F | DIASTOLIC BLOOD PRESSURE: 82 MMHG | HEIGHT: 70 IN | BODY MASS INDEX: 28.6 KG/M2 | OXYGEN SATURATION: 97 %

## 2024-07-31 DIAGNOSIS — K21.9 GASTROESOPHAGEAL REFLUX DISEASE WITHOUT ESOPHAGITIS: ICD-10-CM

## 2024-07-31 DIAGNOSIS — K76.0 HEPATIC STEATOSIS: ICD-10-CM

## 2024-07-31 DIAGNOSIS — R19.7 ACUTE DIARRHEA: ICD-10-CM

## 2024-07-31 PROBLEM — 197321007: Status: ACTIVE | Noted: 2024-07-31

## 2024-07-31 PROCEDURE — 99214 OFFICE O/P EST MOD 30 MIN: CPT

## 2024-07-31 RX ORDER — LEVOTHYROXINE SODIUM 100 UG/1
TABLET ORAL ONCE A DAY
Refills: 0 | Status: ACTIVE | COMMUNITY
Start: 2022-03-21

## 2024-07-31 RX ORDER — OMEPRAZOLE 40 MG/1
1 CAPSULE 30 MINUTES BEFORE MORNING MEAL CAPSULE, DELAYED RELEASE ORAL ONCE A DAY
Qty: 90 | Refills: 3 | Status: ACTIVE | COMMUNITY
Start: 2023-06-13

## 2024-07-31 RX ORDER — BUPROPION HYDROCHLORIDE 200 MG/1
TABLET, FILM COATED, EXTENDED RELEASE ORAL ONCE A DAY
Refills: 0 | Status: ACTIVE | COMMUNITY
Start: 2022-03-21

## 2024-07-31 RX ORDER — SODIUM CHLORIDE 1 G/1
TABLET ORAL ONCE A DAY
Refills: 0 | Status: ACTIVE | COMMUNITY
Start: 2022-03-21

## 2024-07-31 RX ORDER — ALBUTEROL SULFATE 90 UG/1
AEROSOL, METERED RESPIRATORY (INHALATION) AS NEEDED
Refills: 0 | Status: ACTIVE | COMMUNITY
Start: 2022-03-21

## 2024-07-31 RX ORDER — AMINO ACIDS/MV,IRON,MIN
TABLET ORAL TWICE A DAY
Refills: 0 | Status: ACTIVE | COMMUNITY
Start: 2022-03-21

## 2024-07-31 RX ORDER — MULTIVITAMIN
TABLET ORAL ONCE A DAY
Refills: 0 | Status: ACTIVE | COMMUNITY
Start: 2022-03-21

## 2024-07-31 RX ORDER — AVOBENZONE, HOMOSALATE, OCTISALATE, OCTOCRYLENE 30; 100; 50; 100 MG/ML; MG/ML; MG/ML; MG/ML
LOTION TOPICAL ONCE A DAY
Refills: 0 | Status: ACTIVE | COMMUNITY
Start: 2022-03-21

## 2024-07-31 RX ORDER — CLONIDINE 0.2 MG/24H
PATCH, EXTENDED RELEASE TRANSDERMAL
Refills: 0 | Status: ACTIVE | COMMUNITY
Start: 2022-03-21

## 2024-07-31 RX ORDER — ATENOLOL 25 MG/1
TAKE ONE TABLET BY MOUTH ONE TIME DAILY TABLET ORAL
Qty: 90 UNSPECIFIED | Refills: 0 | Status: ACTIVE | COMMUNITY

## 2024-07-31 RX ORDER — DIMENHYDRINATE 50 MG
TABLET ORAL ONCE A DAY
Refills: 0 | Status: ACTIVE | COMMUNITY
Start: 2022-03-21

## 2024-07-31 NOTE — HPI-PREVIOUS PROCEDURES
4/19/2023 anal skin tag excision with Dr. Carrasco 11/1/2016 colonoscopy with Dr. Lugo consistent with examined portion of ileum normal Entire examined colon normal Nonbleeding external and internal hemorrhoids Random colon biopsies with no abnormalities Recall for 7 years 10/10/2016 endoscopy consistent with LA grade a reflux esophagitis 2 cm hiatal hernia Portal hypertensive gastropathy Nonbleeding erosive gastropathy Antrum body consistent with mild congestion negative for H. pylori Nonbleeding gastric ulcer with no stigmata of bleeding Duodenal erosions without bleeding, terminal ileum biopsy consistent with nonspecific pathologic alteration with no acute inflammation Normal second part of duodenum biopsy consistent with normal villous architecture negative for celiac

## 2024-07-31 NOTE — HPI-ZZZTODAY'S VISIT
Patient is a very pleasant 68-year-old male who presents for refill/GERD.  He is a patient of Dr. Lugo.  Last seen by GILMA Oconnell on 8/14/2023.  Past medical history significant for hepatic steatosis, GERD, hypothyroidism, COPD.  Past surgical history consistent with hemithyroidectomy.  Last colonoscopy and endoscopy 10/10/2016.  Family history noncontributory. Patient takes omeprazole 40 mg with good efficacy for his GERD.  He gets FibroScan's every other year for his history of hepatic steatosis.  Patient presents for chief complaint of refill of his omeprazole for acid reflux and diarrhea.  He relates that he is doing well on his omeprazole 40 mg daily for acid reflux.  He gets no breakthrough symptoms.  His main concern today is for new onset diarrhea.  For the past 2 weeks he has had between 3-8 bowel movements a day ([place] stool 6-7).  He denies change in medications, recent travel, exposure to sick contacts, hospitalization, antibiotic use, camping, drinking different water.  He has had an episode like this several years previously.  He had a workup at that time as well that came back negative.  It resolved on its own at that time. He does admit he was on magnesium recently, however, when he developed diarrhea he discontinued this and it has not improved. He also has apparently had lab work just prior to the diarrhea starting. At that time his thyroid labs were within normal limits. He has no accompanying symptoms.  He denies dysphagia, dyspepsia, pyrosis, abdominal pain, unintentional weight loss, melena, hematochezia, fever, chills, nausea, vomiting.

## 2024-07-31 NOTE — HPI-PREVIOUS IMAGING
7/7/2023 FibroScan consistent with mild steatosis S1, no fibrosis F0 4/15/2022 FibroScan consistent with moderate steatosis S2, no fibrosis F0 4/15/2021 FibroScan consistent with moderate steatosis S2, no fibrosis F0

## 2024-08-12 ENCOUNTER — TELEPHONE ENCOUNTER (OUTPATIENT)
Dept: URBAN - METROPOLITAN AREA CLINIC 63 | Facility: CLINIC | Age: 69
End: 2024-08-12

## 2024-08-22 ENCOUNTER — TELEPHONE ENCOUNTER (OUTPATIENT)
Dept: URBAN - METROPOLITAN AREA CLINIC 63 | Facility: CLINIC | Age: 69
End: 2024-08-22

## 2024-08-23 ENCOUNTER — P2P PATIENT RECORD (OUTPATIENT)
Age: 69
End: 2024-08-23

## 2024-08-23 ENCOUNTER — OFFICE VISIT (OUTPATIENT)
Dept: URBAN - METROPOLITAN AREA CLINIC 63 | Facility: CLINIC | Age: 69
End: 2024-08-23
Payer: MEDICARE

## 2024-08-23 ENCOUNTER — TELEPHONE ENCOUNTER (OUTPATIENT)
Dept: URBAN - METROPOLITAN AREA CLINIC 63 | Facility: CLINIC | Age: 69
End: 2024-08-23

## 2024-08-23 VITALS
RESPIRATION RATE: 20 BRPM | TEMPERATURE: 99 F | HEIGHT: 70 IN | OXYGEN SATURATION: 97 % | DIASTOLIC BLOOD PRESSURE: 60 MMHG | BODY MASS INDEX: 28.35 KG/M2 | SYSTOLIC BLOOD PRESSURE: 130 MMHG | HEART RATE: 75 BPM | WEIGHT: 198 LBS

## 2024-08-23 DIAGNOSIS — Z12.11 SCREEN FOR COLON CANCER: ICD-10-CM

## 2024-08-23 DIAGNOSIS — Z87.11 H/O GASTRIC ULCER: ICD-10-CM

## 2024-08-23 DIAGNOSIS — K62.5 RECTAL BLEEDING: ICD-10-CM

## 2024-08-23 DIAGNOSIS — K92.1 MELENA: ICD-10-CM

## 2024-08-23 PROBLEM — 2901004: Status: ACTIVE | Noted: 2024-08-23

## 2024-08-23 PROBLEM — 12063002: Status: ACTIVE | Noted: 2024-08-23

## 2024-08-23 PROBLEM — 305058001: Status: ACTIVE | Noted: 2024-08-23

## 2024-08-23 PROBLEM — 236071009: Status: ACTIVE | Noted: 2024-08-23

## 2024-08-23 PROBLEM — 275548000: Status: ACTIVE | Noted: 2024-08-23

## 2024-08-23 PROCEDURE — 99215 OFFICE O/P EST HI 40 MIN: CPT | Performed by: INTERNAL MEDICINE

## 2024-08-23 RX ORDER — ATENOLOL 25 MG/1
TAKE ONE TABLET BY MOUTH ONE TIME DAILY TABLET ORAL
Qty: 90 UNSPECIFIED | Refills: 0 | Status: ACTIVE | COMMUNITY

## 2024-08-23 RX ORDER — DIMENHYDRINATE 50 MG
TABLET ORAL ONCE A DAY
Refills: 0 | Status: ACTIVE | COMMUNITY
Start: 2022-03-21

## 2024-08-23 RX ORDER — OMEPRAZOLE 40 MG/1
1 CAPSULE 30 MINUTES BEFORE MORNING MEAL CAPSULE, DELAYED RELEASE ORAL ONCE A DAY
Qty: 90 | Refills: 3 | Status: ACTIVE | COMMUNITY
Start: 2023-06-13

## 2024-08-23 RX ORDER — MULTIVITAMIN
TABLET ORAL ONCE A DAY
Refills: 0 | Status: ACTIVE | COMMUNITY
Start: 2022-03-21

## 2024-08-23 RX ORDER — SODIUM CHLORIDE 1 G/1
TABLET ORAL ONCE A DAY
Refills: 0 | Status: ACTIVE | COMMUNITY
Start: 2022-03-21

## 2024-08-23 RX ORDER — AMINO ACIDS/MV,IRON,MIN
TABLET ORAL TWICE A DAY
Refills: 0 | Status: ACTIVE | COMMUNITY
Start: 2022-03-21

## 2024-08-23 RX ORDER — LEVOTHYROXINE SODIUM 100 UG/1
TABLET ORAL ONCE A DAY
Refills: 0 | Status: ACTIVE | COMMUNITY
Start: 2022-03-21

## 2024-08-23 RX ORDER — BUPROPION HYDROCHLORIDE 100 MG/1
1 TABLET IN THE MORNING TABLET, FILM COATED, EXTENDED RELEASE ORAL ONCE A DAY
Refills: 0 | Status: ACTIVE | COMMUNITY
Start: 2022-03-21

## 2024-08-23 RX ORDER — AVOBENZONE, HOMOSALATE, OCTISALATE, OCTOCRYLENE 30; 100; 50; 100 MG/ML; MG/ML; MG/ML; MG/ML
LOTION TOPICAL ONCE A DAY
Refills: 0 | Status: ACTIVE | COMMUNITY
Start: 2022-03-21

## 2024-08-23 RX ORDER — CLONIDINE 0.2 MG/24H
PATCH, EXTENDED RELEASE TRANSDERMAL
Refills: 0 | Status: ACTIVE | COMMUNITY
Start: 2022-03-21

## 2024-08-23 RX ORDER — ALBUTEROL SULFATE 90 UG/1
AEROSOL, METERED RESPIRATORY (INHALATION) AS NEEDED
Refills: 0 | Status: ACTIVE | COMMUNITY
Start: 2022-03-21

## 2024-08-23 NOTE — HPI-TODAY'S VISIT:
Chemo is a pleasant 68-year-old male who is previously a patient of mine last seen 2016. Over the past few weeks he has been noticing intermittent on and off black tarry stools and also occasional bright red blood-significant amount over the weekend.  He was instructed to go to the emergency room yesterday.  Reviewed records.  His hemoglobin was 13.8 g.  He did not meet admission criteria and was sent home. He is today here in follow-up.  In addition to the above symptoms he reports 2 months history of diarrhea.  He had sore throat prior to that.  He was taking 8 ibuprofens per day for about 4 to 5 days.  No dietary culprits.  He reports some perianal discomfort and occasionally some left-sided abdominal discomfort.  He has stopped using ibuprofen and aspirin.  He takes omeprazole 40 mg daily for acid reflux.  He denies any dysphagia.  Denies unintentional weight loss or loss of appetite.  He has gained a few pounds. He did report taking Pepto-Bismol on and off but has had dark stools even prior to that. He has had a history of hemorrhoid surgery  Father was diagnosed with pancreatic cancer in his late 80s

## 2024-08-23 NOTE — HPI-PREVIOUS LABS
Labs August 22, 2024: WBC 3, hemoglobin 13.8 g MCV 95, platelets 195 Normal electrolytes, BUN 12 creatinine 0.91, serum albumin 4.0, normal liver enzymes,  Labs August 2024: BUN 6, creatinine 0.92, normal electrolytes, normal liver enzymes, serum albumin 3.8 WBC 5.8, hemoglobin 14.1 g, MCV 98 platelets 226 C. difficile not detected stool cultures negative- salmonella pending ova and parasites negative Fecal calprotectin, pancreatic elastase pending, fit test pending Giardia pending

## 2024-08-23 NOTE — HPI-PREVIOUS PROCEDURES
Upper endoscopy Dr. Lugo 2016 LA grade A erosive esophagitis, unremarkable pathology, 2 cm hiatal hernia, portal hypertensive gastropathy, (mild), multiple gastric erosions H. pylori negative, superficial gastric ulcer in the gastric antrum H. pylori negative, multiple erosions in the duodenal bulb and normal second portion of the duodenum with unremarkable pathology  Colonoscopy Dr. Lugo 2016: Normal terminal ileum, procedure performed the difficulty, good bowel prep, grade 2 hemorrhoids.  No polyps.  No diverticulosis.  Random biopsies negative for microscopic colitis

## 2024-08-30 ENCOUNTER — LAB OUTSIDE AN ENCOUNTER (OUTPATIENT)
Dept: URBAN - METROPOLITAN AREA CLINIC 63 | Facility: CLINIC | Age: 69
End: 2024-08-30

## 2024-09-04 ENCOUNTER — TELEPHONE ENCOUNTER (OUTPATIENT)
Dept: URBAN - METROPOLITAN AREA CLINIC 63 | Facility: CLINIC | Age: 69
End: 2024-09-04

## 2024-09-09 ENCOUNTER — OFFICE VISIT (OUTPATIENT)
Dept: URBAN - METROPOLITAN AREA SURGERY CENTER 4 | Facility: SURGERY CENTER | Age: 69
End: 2024-09-09

## 2024-09-16 ENCOUNTER — CLAIMS CREATED FROM THE CLAIM WINDOW (OUTPATIENT)
Dept: URBAN - METROPOLITAN AREA SURGERY CENTER 4 | Facility: SURGERY CENTER | Age: 69
End: 2024-09-16
Payer: MEDICARE

## 2024-09-16 ENCOUNTER — CLAIMS CREATED FROM THE CLAIM WINDOW (OUTPATIENT)
Dept: URBAN - METROPOLITAN AREA CLINIC 4 | Facility: CLINIC | Age: 69
End: 2024-09-16
Payer: MEDICARE

## 2024-09-16 DIAGNOSIS — K26.9 DUODENAL ULCER, UNSPECIFIED AS ACUTE OR CHRONIC, WITHOUT HEMORRHAGE OR PERFORATION: ICD-10-CM

## 2024-09-16 DIAGNOSIS — K29.70 GASTRITIS WITHOUT BLEEDING, UNSPECIFIED CHRONICITY, UNSPECIFIED GASTRITIS TYPE: ICD-10-CM

## 2024-09-16 DIAGNOSIS — K63.89 OTHER SPECIFIED DISEASES OF INTESTINE: ICD-10-CM

## 2024-09-16 DIAGNOSIS — Z12.11 ENCOUNTER FOR SCREENING FOR MALIGNANT NEOPLASM OF COLON: ICD-10-CM

## 2024-09-16 DIAGNOSIS — K64.1 SECOND DEGREE HEMORRHOIDS: ICD-10-CM

## 2024-09-16 DIAGNOSIS — I85.00 ESOPHAGEAL VARICES WITHOUT BLEEDING, UNSPECIFIED ESOPHAGEAL VARICES TYPE: ICD-10-CM

## 2024-09-16 DIAGNOSIS — Z12.11 COLON CANCER SCREENING: ICD-10-CM

## 2024-09-16 DIAGNOSIS — K57.30 DIVERTICULOSIS OF LARGE INTESTINE WITHOUT PERFORATION OR ABSCESS WITHOUT BLEEDING: ICD-10-CM

## 2024-09-16 DIAGNOSIS — D12.3 ADENOMATOUS POLYP OF TRANSVERSE COLON: ICD-10-CM

## 2024-09-16 DIAGNOSIS — K63.5 POLYP OF TRANSVERSE COLON, UNSPECIFIED TYPE: ICD-10-CM

## 2024-09-16 DIAGNOSIS — K31.89 OTHER DISEASES OF STOMACH AND DUODENUM: ICD-10-CM

## 2024-09-16 DIAGNOSIS — K29.70 CHRONIC ACITVE GASTRITIS (H.PYLORI NEGATIVE): ICD-10-CM

## 2024-09-16 PROCEDURE — 88305 TISSUE EXAM BY PATHOLOGIST: CPT | Performed by: PATHOLOGY

## 2024-09-16 PROCEDURE — 00813 ANES UPR LWR GI NDSC PX: CPT | Performed by: NURSE ANESTHETIST, CERTIFIED REGISTERED

## 2024-09-16 PROCEDURE — 88313 SPECIAL STAINS GROUP 2: CPT | Performed by: PATHOLOGY

## 2024-09-16 PROCEDURE — 45380 COLONOSCOPY AND BIOPSY: CPT | Performed by: CLINIC/CENTER

## 2024-09-16 PROCEDURE — 45385 COLONOSCOPY W/LESION REMOVAL: CPT | Performed by: INTERNAL MEDICINE

## 2024-09-16 PROCEDURE — 88312 SPECIAL STAINS GROUP 1: CPT | Performed by: PATHOLOGY

## 2024-09-16 PROCEDURE — 43239 EGD BIOPSY SINGLE/MULTIPLE: CPT | Performed by: INTERNAL MEDICINE

## 2024-09-16 PROCEDURE — 45380 COLONOSCOPY AND BIOPSY: CPT | Performed by: INTERNAL MEDICINE

## 2024-09-16 PROCEDURE — 88302 TISSUE EXAM BY PATHOLOGIST: CPT | Performed by: PATHOLOGY

## 2024-09-16 PROCEDURE — 43239 EGD BIOPSY SINGLE/MULTIPLE: CPT | Performed by: CLINIC/CENTER

## 2024-09-16 PROCEDURE — 88342 IMHCHEM/IMCYTCHM 1ST ANTB: CPT | Performed by: PATHOLOGY

## 2024-09-16 PROCEDURE — 45385 COLONOSCOPY W/LESION REMOVAL: CPT | Performed by: CLINIC/CENTER

## 2024-09-16 RX ORDER — BUPROPION HYDROCHLORIDE 100 MG/1
1 TABLET IN THE MORNING TABLET, FILM COATED, EXTENDED RELEASE ORAL ONCE A DAY
Refills: 0 | Status: ACTIVE | COMMUNITY
Start: 2022-03-21

## 2024-09-16 RX ORDER — CLONIDINE 0.2 MG/24H
PATCH, EXTENDED RELEASE TRANSDERMAL
Refills: 0 | Status: ACTIVE | COMMUNITY
Start: 2022-03-21

## 2024-09-16 RX ORDER — AVOBENZONE, HOMOSALATE, OCTISALATE, OCTOCRYLENE 30; 100; 50; 100 MG/ML; MG/ML; MG/ML; MG/ML
LOTION TOPICAL ONCE A DAY
Refills: 0 | Status: ACTIVE | COMMUNITY
Start: 2022-03-21

## 2024-09-16 RX ORDER — LEVOTHYROXINE SODIUM 100 UG/1
TABLET ORAL ONCE A DAY
Refills: 0 | Status: ACTIVE | COMMUNITY
Start: 2022-03-21

## 2024-09-16 RX ORDER — DIMENHYDRINATE 50 MG
TABLET ORAL ONCE A DAY
Refills: 0 | Status: ACTIVE | COMMUNITY
Start: 2022-03-21

## 2024-09-16 RX ORDER — MULTIVITAMIN
TABLET ORAL ONCE A DAY
Refills: 0 | Status: ACTIVE | COMMUNITY
Start: 2022-03-21

## 2024-09-16 RX ORDER — AMINO ACIDS/MV,IRON,MIN
TABLET ORAL TWICE A DAY
Refills: 0 | Status: ACTIVE | COMMUNITY
Start: 2022-03-21

## 2024-09-16 RX ORDER — SODIUM CHLORIDE 1 G/1
TABLET ORAL ONCE A DAY
Refills: 0 | Status: ACTIVE | COMMUNITY
Start: 2022-03-21

## 2024-09-16 RX ORDER — ATENOLOL 25 MG/1
TAKE ONE TABLET BY MOUTH ONE TIME DAILY TABLET ORAL
Qty: 90 UNSPECIFIED | Refills: 0 | Status: ACTIVE | COMMUNITY

## 2024-09-16 RX ORDER — OMEPRAZOLE 40 MG/1
1 CAPSULE 30 MINUTES BEFORE MORNING MEAL CAPSULE, DELAYED RELEASE ORAL ONCE A DAY
Qty: 90 | Refills: 3 | Status: ACTIVE | COMMUNITY
Start: 2023-06-13

## 2024-09-16 RX ORDER — ALBUTEROL SULFATE 90 UG/1
AEROSOL, METERED RESPIRATORY (INHALATION) AS NEEDED
Refills: 0 | Status: ACTIVE | COMMUNITY
Start: 2022-03-21

## 2024-09-17 ENCOUNTER — P2P PATIENT RECORD (OUTPATIENT)
Age: 69
End: 2024-09-17

## 2024-09-19 ENCOUNTER — TELEPHONE ENCOUNTER (OUTPATIENT)
Dept: URBAN - METROPOLITAN AREA CLINIC 63 | Facility: CLINIC | Age: 69
End: 2024-09-19

## 2024-09-25 ENCOUNTER — TELEPHONE ENCOUNTER (OUTPATIENT)
Dept: URBAN - METROPOLITAN AREA CLINIC 63 | Facility: CLINIC | Age: 69
End: 2024-09-25

## 2024-10-03 ENCOUNTER — OFFICE VISIT (OUTPATIENT)
Dept: URBAN - METROPOLITAN AREA CLINIC 63 | Facility: CLINIC | Age: 69
End: 2024-10-03
Payer: MEDICARE

## 2024-10-03 VITALS
OXYGEN SATURATION: 98 % | SYSTOLIC BLOOD PRESSURE: 120 MMHG | HEART RATE: 76 BPM | WEIGHT: 198 LBS | DIASTOLIC BLOOD PRESSURE: 70 MMHG | HEIGHT: 70 IN | TEMPERATURE: 98.7 F | BODY MASS INDEX: 28.35 KG/M2

## 2024-10-03 DIAGNOSIS — K52.9 CHRONIC DIARRHEA: ICD-10-CM

## 2024-10-03 DIAGNOSIS — Z87.11 H/O GASTRIC ULCER: ICD-10-CM

## 2024-10-03 DIAGNOSIS — K62.5 RECTAL BLEEDING: ICD-10-CM

## 2024-10-03 DIAGNOSIS — K92.1 MELENA: ICD-10-CM

## 2024-10-03 DIAGNOSIS — K76.0 FATTY LIVER: ICD-10-CM

## 2024-10-03 PROBLEM — 197321007: Status: ACTIVE | Noted: 2024-10-03

## 2024-10-03 PROCEDURE — 99214 OFFICE O/P EST MOD 30 MIN: CPT | Performed by: INTERNAL MEDICINE

## 2024-10-03 RX ORDER — CLONIDINE 0.2 MG/24H
PATCH, EXTENDED RELEASE TRANSDERMAL
Refills: 0 | Status: ACTIVE | COMMUNITY
Start: 2022-03-21

## 2024-10-03 RX ORDER — ALBUTEROL SULFATE 90 UG/1
AEROSOL, METERED RESPIRATORY (INHALATION) AS NEEDED
Refills: 0 | Status: ACTIVE | COMMUNITY
Start: 2022-03-21

## 2024-10-03 RX ORDER — OMEPRAZOLE 40 MG/1
1 CAPSULE 30 MINUTES BEFORE MORNING MEAL CAPSULE, DELAYED RELEASE ORAL ONCE A DAY
Qty: 90 | Refills: 3 | Status: ACTIVE | COMMUNITY
Start: 2023-06-13

## 2024-10-03 RX ORDER — AMINO ACIDS/MV,IRON,MIN
TABLET ORAL TWICE A DAY
Refills: 0 | Status: ACTIVE | COMMUNITY
Start: 2022-03-21

## 2024-10-03 RX ORDER — ATENOLOL 25 MG/1
TAKE ONE TABLET BY MOUTH ONE TIME DAILY TABLET ORAL
Qty: 90 UNSPECIFIED | Refills: 0 | Status: ACTIVE | COMMUNITY

## 2024-10-03 RX ORDER — MULTIVITAMIN
TABLET ORAL ONCE A DAY
Refills: 0 | Status: ACTIVE | COMMUNITY
Start: 2022-03-21

## 2024-10-03 RX ORDER — DIMENHYDRINATE 50 MG
TABLET ORAL ONCE A DAY
Refills: 0 | Status: ACTIVE | COMMUNITY
Start: 2022-03-21

## 2024-10-03 RX ORDER — SODIUM CHLORIDE 1 G/1
TABLET ORAL ONCE A DAY
Refills: 0 | Status: ACTIVE | COMMUNITY
Start: 2022-03-21

## 2024-10-03 RX ORDER — LEVOTHYROXINE SODIUM 100 UG/1
TABLET ORAL ONCE A DAY
Refills: 0 | Status: ACTIVE | COMMUNITY
Start: 2022-03-21

## 2024-10-03 RX ORDER — BUPROPION HYDROCHLORIDE 100 MG/1
1 TABLET IN THE MORNING TABLET, FILM COATED, EXTENDED RELEASE ORAL ONCE A DAY
Refills: 0 | Status: ACTIVE | COMMUNITY
Start: 2022-03-21

## 2024-10-03 RX ORDER — AVOBENZONE, HOMOSALATE, OCTISALATE, OCTOCRYLENE 30; 100; 50; 100 MG/ML; MG/ML; MG/ML; MG/ML
LOTION TOPICAL ONCE A DAY
Refills: 0 | Status: ACTIVE | COMMUNITY
Start: 2022-03-21

## 2024-10-03 NOTE — HPI-PREVIOUS LABS
Labs August 2024: WBC 5.8, hemoglobin 14.1 g MCV 98 platelets 226, BUN 6 creatinine 0.92, normal electrolytes, serum albumin 3.8, normal liver enzymes,  Stool studies:C. difficile negative, cultures negative, Fecal globin not detected  Labs August 22, 2024: WBC 3, hemoglobin 13.8 g MCV 95, platelets 195 Normal electrolytes, BUN 12 creatinine 0.91, serum albumin 4.0, normal liver enzymes,  Labs August 2024: BUN 6, creatinine 0.92, normal electrolytes, normal liver enzymes, serum albumin 3.8 WBC 5.8, hemoglobin 14.1 g, MCV 98 platelets 226 C. difficile not detected stool cultures negative- salmonella pending ova and parasites negative Fecal calprotectin, pancreatic elastase pending, fit test pending Giardia pending

## 2024-10-03 NOTE — HPI-TODAY'S VISIT:
Sudhir is here today in follow-up. He has a history of chronic diarrhea and recently significant rectal bleeding with no drop in his hemoglobin. She is here in follow-up after his upper endoscopy and colonoscopy. Briefly findings included erosive gastroduodenitis without any active bleeding H. pylori negative and a colonoscopy noting no colitis with unremarkable random biopsies. Sigmoid diverticulosis. Congestion noted in the entire examined gastrointestinal tract make me suspect that he might have a degree of portal hypertension. He describes that he has POTS and believes that dysautonomia can cause liver disease and portal hypertension He does not drink alcohol. His abdominal pain has resolved. Following the colonoscopy is symptoms of diarrhea have resolved as well but this past 10 days he had recurrence of diarrhea. Dietary history revealed that he eats cookies, chocolate and cake etc. denies excessive dairy intake. Denies NSAID use. Reports no weight loss. Stool studies were unremarkable for infectious etiology but his fecal calprotectin and fecal elastase were not done In 2017 he had a CT enterography that was unremarkable HIDA scan noted decreased gallbladder ejection fraction    PREVIOUS VISIT  on and off black tarry stools and also occasional bright red blood-significant amount over the weekend.  He was instructed to go to the emergency room yesterday.  Reviewed records.  His hemoglobin was 13.8 g.  He did not meet admission criteria and was sent home. He is today here in follow-up.  In addition to the above symptoms he reports 2 months history of diarrhea.  He had sore throat prior to that.  He was taking 8 ibuprofens per day for about 4 to 5 days.  No dietary culprits.  He reports some perianal discomfort and occasionally some left-sided abdominal discomfort. He has had a history of hemorrhoid surgery Father was diagnosed with pancreatic cancer in his late 80s

## 2024-10-03 NOTE — HPI-PREVIOUS PROCEDURES
Upper endoscopy Dr. Lugo September 2024: Grade 1 varices in the lower third of esophagus-no bleeding stigmata Moderate inflammation in the antrum and body with congestion-biopsies negative for H. pylori, diffuse mildly congested mucosa in the gastric body, no hiatal hernia, few erosions in the duodenal bulb, scalloping and congestion in the second portion of the duodenum biopsies unremarkable  Colonoscopy Dr. Lugo September 2024: Adequate bowel prep, procedure performed without difficulty, 6 mm splenic flexure polyp-fecal material only, mildly congested mucosa in the entire colon biopsies unremarkable, sigmoid diverticulosis, grade 2 hemorrhoids and normal terminal ileum-recall colonoscopy 5 year  Upper endoscopy Dr. Lugo 2016 LA grade A erosive esophagitis, unremarkable pathology, 2 cm hiatal hernia, portal hypertensive gastropathy, (mild), multiple gastric erosions H. pylori negative, superficial gastric ulcer in the gastric antrum H. pylori negative, multiple erosions in the duodenal bulb and normal second portion of the duodenum with unremarkable pathology  Colonoscopy Dr. Lugo 2016: Normal terminal ileum, procedure performed the difficulty, good bowel prep, grade 2 hemorrhoids.  No polyps.  No diverticulosis.  Random biopsies negative for microscopic colitis

## 2024-10-03 NOTE — HPI-PREVIOUS IMAGING
FibroScan transient elastography elastography-July 2023: S1/F0 FibroScan transient elastography elastography-July 2023: HIDA scan 2017 gallbladder ejection fraction 13% CT enterography with contrast 2017: Normal liver gallbladder pancreas spleen, diverticulosis without diverticulitis, normal small and large bowel

## 2024-10-17 ENCOUNTER — OFFICE VISIT (OUTPATIENT)
Dept: URBAN - METROPOLITAN AREA CLINIC 63 | Facility: CLINIC | Age: 69
End: 2024-10-17

## 2024-11-11 ENCOUNTER — TELEPHONE ENCOUNTER (OUTPATIENT)
Dept: URBAN - METROPOLITAN AREA CLINIC 64 | Facility: CLINIC | Age: 69
End: 2024-11-11

## 2025-01-03 ENCOUNTER — OFFICE VISIT (OUTPATIENT)
Dept: URBAN - METROPOLITAN AREA CLINIC 63 | Facility: CLINIC | Age: 70
End: 2025-01-03

## 2025-02-14 ENCOUNTER — OFFICE VISIT (OUTPATIENT)
Dept: URBAN - METROPOLITAN AREA CLINIC 63 | Facility: CLINIC | Age: 70
End: 2025-02-14

## 2025-02-21 ENCOUNTER — OFFICE VISIT (OUTPATIENT)
Dept: URBAN - METROPOLITAN AREA CLINIC 63 | Facility: CLINIC | Age: 70
End: 2025-02-21
Payer: MEDICARE

## 2025-02-21 ENCOUNTER — LAB OUTSIDE AN ENCOUNTER (OUTPATIENT)
Dept: URBAN - METROPOLITAN AREA CLINIC 63 | Facility: CLINIC | Age: 70
End: 2025-02-21

## 2025-02-21 VITALS
HEART RATE: 76 BPM | SYSTOLIC BLOOD PRESSURE: 138 MMHG | BODY MASS INDEX: 28.09 KG/M2 | TEMPERATURE: 98.7 F | DIASTOLIC BLOOD PRESSURE: 78 MMHG | HEIGHT: 70 IN | OXYGEN SATURATION: 98 % | WEIGHT: 196.2 LBS

## 2025-02-21 DIAGNOSIS — Z87.11 H/O GASTRIC ULCER: ICD-10-CM

## 2025-02-21 DIAGNOSIS — Z12.11 SCREEN FOR COLON CANCER: ICD-10-CM

## 2025-02-21 DIAGNOSIS — K62.5 RECTAL BLEEDING: ICD-10-CM

## 2025-02-21 DIAGNOSIS — K52.9 CHRONIC DIARRHEA: ICD-10-CM

## 2025-02-21 DIAGNOSIS — Z80.0 FAMILY HISTORY OF PANCREATIC CANCER: ICD-10-CM

## 2025-02-21 DIAGNOSIS — K76.0 FATTY LIVER: ICD-10-CM

## 2025-02-21 DIAGNOSIS — K92.1 MELENA: ICD-10-CM

## 2025-02-21 PROCEDURE — 99214 OFFICE O/P EST MOD 30 MIN: CPT | Performed by: INTERNAL MEDICINE

## 2025-02-21 RX ORDER — GALANTAMINE HYDROBROMIDE 24 MG/1
1 CAPSULE IN THE MORNING WITH FOOD CAPSULE, EXTENDED RELEASE ORAL ONCE A DAY
Status: ACTIVE | COMMUNITY

## 2025-02-21 RX ORDER — MULTIVITAMIN
TABLET ORAL ONCE A DAY
Refills: 0 | Status: ACTIVE | COMMUNITY
Start: 2022-03-21

## 2025-02-21 RX ORDER — ALBUTEROL SULFATE 90 UG/1
AEROSOL, METERED RESPIRATORY (INHALATION) AS NEEDED
Refills: 0 | Status: ACTIVE | COMMUNITY
Start: 2022-03-21

## 2025-02-21 RX ORDER — AMINO ACIDS/MV,IRON,MIN
TABLET ORAL TWICE A DAY
Refills: 0 | Status: ACTIVE | COMMUNITY
Start: 2022-03-21

## 2025-02-21 RX ORDER — CLONIDINE 0.2 MG/24H
PATCH, EXTENDED RELEASE TRANSDERMAL
Refills: 0 | Status: ACTIVE | COMMUNITY
Start: 2022-03-21

## 2025-02-21 RX ORDER — OMEPRAZOLE 40 MG/1
1 CAPSULE 30 MINUTES BEFORE MORNING MEAL CAPSULE, DELAYED RELEASE ORAL TWICE DAILY
Refills: 3 | Status: ACTIVE | COMMUNITY
Start: 2023-06-13

## 2025-02-21 RX ORDER — DIMENHYDRINATE 50 MG
TABLET ORAL ONCE A DAY
Refills: 0 | Status: ACTIVE | COMMUNITY
Start: 2022-03-21

## 2025-02-21 RX ORDER — ATENOLOL 25 MG/1
TAKE ONE TABLET BY MOUTH ONE TIME DAILY TABLET ORAL ONCE A DAY
Refills: 0 | Status: ACTIVE | COMMUNITY

## 2025-02-21 RX ORDER — SODIUM CHLORIDE 1 G/1
TABLET ORAL ONCE A DAY
Refills: 0 | Status: ACTIVE | COMMUNITY
Start: 2022-03-21

## 2025-02-21 RX ORDER — SUCRALFATE 1 G/1
1 TABLET ON AN EMPTY STOMACH TABLET ORAL
Status: ACTIVE | COMMUNITY

## 2025-02-21 RX ORDER — PREGABALIN 50 MG/1
1 CAPSULE CAPSULE ORAL TWICE A DAY
Status: ACTIVE | COMMUNITY

## 2025-02-21 RX ORDER — LEVOTHYROXINE SODIUM 100 UG/1
TABLET ORAL ONCE A DAY
Refills: 0 | Status: ACTIVE | COMMUNITY
Start: 2022-03-21

## 2025-02-21 NOTE — HPI-TODAY'S VISIT:
Sudhir is here today in follow-up. He was last seen in the office in October 2024.  He has a history of several weeks of dark stools back in September that led to his endoscopy and colonoscopy in September which noted normal active bleeding.  Grade 1 esophageal varices were noted with no bleeding stigmata.  This suggests that he has portal hypertension however his CT enterography in 2017 noted a normal liver and his FibroScan in July 2023 noted 0 fibrosis. There is no history of heavy alcohol use but admits that in his college days he drank heavily. His primary care physician placed him on PPIs twice daily and Carafate and after a few weeks his dark stools resolved. At the time he had at least 8-12 bowel movements per day and now has at least 3-4 bowel movements, loose in consistency.  Stool studies were negative, fecal calprotectin 91, fecal elastase normal. Recent labs note mild iron deficiency in January 2025.  He has normal liver enzymes and serum albumin thrombocytopenia.  Colonoscopy noted no colitis and random biopsies negative for microscopic colitis.  He was placed on a low FODMAP diet but apparently that was depriving him of his protein and so he came off that diet. Pancreas enzymes apparently did not provide any significant benefit. H/O COVID  ( DIARRHEA STARTED SEVERAL WEEKS AFTER HIS COVID RECOVERY )  LAST OFFICE VISIT : OCT 2024 Sudhir is here today in follow-up. He has a history of chronic diarrhea and recently significant rectal bleeding with no drop in his hemoglobin. She is here in follow-up after his upper endoscopy and colonoscopy. Briefly findings included erosive gastroduodenitis without any active bleeding H. pylori negative and a colonoscopy noting no colitis with unremarkable random biopsies. Sigmoid diverticulosis. Congestion noted in the entire examined gastrointestinal tract make me suspect that he might have a degree of portal hypertension. He describes that he has POTS and believes that dysautonomia can cause liver disease and portal hypertension He does not drink alcohol. His abdominal pain has resolved. Following the colonoscopy is symptoms of diarrhea have resolved as well but this past 10 days he had recurrence of diarrhea. Dietary history revealed that he eats cookies, chocolate and cake etc. denies excessive dairy intake. Denies NSAID use. Reports no weight loss. Stool studies were unremarkable for infectious etiology but his fecal calprotectin and fecal elastase UNREMARKABLE In 2017 he had a CT enterography that was unremarkable HIDA scan noted decreased gallbladder ejection fraction    PREVIOUS VISIT  on and off black tarry stools and also occasional bright red blood-significant amount over the weekend.  He was instructed to go to the emergency room yesterday.  Reviewed records.  His hemoglobin was 13.8 g.  He did not meet admission criteria and was sent home. He is today here in follow-up.  In addition to the above symptoms he reports 2 months history of diarrhea.  He had sore throat prior to that.  He was taking 8 ibuprofens per day for about 4 to 5 days.  No dietary culprits.  He reports some perianal discomfort and occasionally some left-sided abdominal discomfort. He has had a history of hemorrhoid surgery Father was diagnosed with pancreatic cancer in his late 80s

## 2025-02-21 NOTE — HPI-PREVIOUS LABS
Labs January 2025 : Serum iron 122, TIBC 399, 31% saturation, ferritin 13 (L), normal lipid panel, normal TSH normal renal function, BUN 6 (L), normal electrolytes, serum albumin 4.1, normal liver enzymes WBC 5.0, hemoglobin 12.9 (L), MCV 93, platelets 200,  Labs November 2024: Hemoglobin 13 g, UA negative normal TSH, normal testosterone  Labs August 2024: WBC 5.8, hemoglobin 14.1 g MCV 98 platelets 226, BUN 6 creatinine 0.92, normal electrolytes, serum albumin 3.8, normal liver enzymes,  Stool studies:C. difficile negative, cultures negative, Fecal globin not detected  Labs August 22, 2024: WBC 3, hemoglobin 13.8 g MCV 95, platelets 195 Normal electrolytes, BUN 12 creatinine 0.91, serum albumin 4.0, normal liver enzymes,  Labs August 2024: BUN 6, creatinine 0.92, normal electrolytes, normal liver enzymes, serum albumin 3.8 WBC 5.8, hemoglobin 14.1 g, MCV 98 platelets 226 C. difficile not detected stool cultures negative- salmonella pending ova and parasites negative Fecal calprotectin, pancreatic elastase pending, fit test pending Giardia pending

## 2025-03-06 ENCOUNTER — LAB OUTSIDE AN ENCOUNTER (OUTPATIENT)
Dept: URBAN - METROPOLITAN AREA CLINIC 63 | Facility: CLINIC | Age: 70
End: 2025-03-06

## 2025-04-01 ENCOUNTER — OFFICE VISIT (OUTPATIENT)
Dept: URBAN - METROPOLITAN AREA CLINIC 63 | Facility: CLINIC | Age: 70
End: 2025-04-01
Payer: MEDICARE

## 2025-04-01 ENCOUNTER — LAB OUTSIDE AN ENCOUNTER (OUTPATIENT)
Dept: URBAN - METROPOLITAN AREA CLINIC 63 | Facility: CLINIC | Age: 70
End: 2025-04-01

## 2025-04-01 DIAGNOSIS — Z80.0 FAMILY HISTORY OF PANCREATIC CANCER: ICD-10-CM

## 2025-04-01 DIAGNOSIS — K21.9 ACID REFLUX: ICD-10-CM

## 2025-04-01 DIAGNOSIS — Z12.11 SCREEN FOR COLON CANCER: ICD-10-CM

## 2025-04-01 DIAGNOSIS — K52.9 CHRONIC DIARRHEA: ICD-10-CM

## 2025-04-01 DIAGNOSIS — Z87.11 H/O GASTRIC ULCER: ICD-10-CM

## 2025-04-01 DIAGNOSIS — K80.20 GALLSTONES: ICD-10-CM

## 2025-04-01 DIAGNOSIS — E61.1 IRON DEFICIENCY: ICD-10-CM

## 2025-04-01 DIAGNOSIS — K62.5 RECTAL BLEEDING: ICD-10-CM

## 2025-04-01 DIAGNOSIS — K92.1 MELENA: ICD-10-CM

## 2025-04-01 DIAGNOSIS — I85.00 IDIOPATHIC ESOPHAGEAL VARICES WITHOUT BLEEDING: ICD-10-CM

## 2025-04-01 PROBLEM — 235919008: Status: ACTIVE | Noted: 2025-04-01

## 2025-04-01 PROBLEM — 14223005: Status: ACTIVE | Noted: 2025-04-01

## 2025-04-01 PROCEDURE — 99214 OFFICE O/P EST MOD 30 MIN: CPT | Performed by: PHYSICIAN ASSISTANT

## 2025-04-01 RX ORDER — LEVOTHYROXINE SODIUM 100 UG/1
TABLET ORAL ONCE A DAY
Refills: 0 | COMMUNITY
Start: 2022-03-21

## 2025-04-01 RX ORDER — GALANTAMINE HYDROBROMIDE 24 MG/1
1 CAPSULE IN THE MORNING WITH FOOD CAPSULE, EXTENDED RELEASE ORAL ONCE A DAY
COMMUNITY

## 2025-04-01 RX ORDER — ALBUTEROL SULFATE 90 UG/1
AEROSOL, METERED RESPIRATORY (INHALATION) AS NEEDED
Refills: 0 | COMMUNITY
Start: 2022-03-21

## 2025-04-01 RX ORDER — OMEPRAZOLE 40 MG/1
1 CAPSULE 30 MINUTES BEFORE MORNING MEAL CAPSULE, DELAYED RELEASE ORAL ONCE A DAY
Qty: 90 | Refills: 3
Start: 2023-06-13

## 2025-04-01 RX ORDER — SODIUM CHLORIDE 1 G/1
TABLET ORAL ONCE A DAY
Refills: 0 | COMMUNITY
Start: 2022-03-21

## 2025-04-01 RX ORDER — CLONIDINE 0.2 MG/24H
PATCH, EXTENDED RELEASE TRANSDERMAL
Refills: 0 | COMMUNITY
Start: 2022-03-21

## 2025-04-01 RX ORDER — AMINO ACIDS/MV,IRON,MIN
TABLET ORAL TWICE A DAY
Refills: 0 | COMMUNITY
Start: 2022-03-21

## 2025-04-01 RX ORDER — MULTIVITAMIN
TABLET ORAL ONCE A DAY
Refills: 0 | COMMUNITY
Start: 2022-03-21

## 2025-04-01 RX ORDER — OMEPRAZOLE 40 MG/1
1 CAPSULE 30 MINUTES BEFORE MORNING MEAL CAPSULE, DELAYED RELEASE ORAL TWICE DAILY
Refills: 3 | COMMUNITY
Start: 2023-06-13

## 2025-04-01 RX ORDER — PREGABALIN 50 MG/1
1 CAPSULE CAPSULE ORAL TWICE A DAY
COMMUNITY

## 2025-04-01 RX ORDER — ATENOLOL 25 MG/1
TAKE ONE TABLET BY MOUTH ONE TIME DAILY TABLET ORAL ONCE A DAY
Refills: 0 | COMMUNITY

## 2025-04-01 RX ORDER — DIMENHYDRINATE 50 MG
TABLET ORAL ONCE A DAY
Refills: 0 | COMMUNITY
Start: 2022-03-21

## 2025-04-01 RX ORDER — SUCRALFATE 1 G/1
1 TABLET ON AN EMPTY STOMACH TABLET ORAL
COMMUNITY

## 2025-04-01 RX ORDER — METRONIDAZOLE 500 MG/1
1 TABLET TABLET ORAL THREE TIMES A DAY
Qty: 30 | OUTPATIENT
Start: 2025-04-01 | End: 2025-04-11

## 2025-04-01 NOTE — HPI-TODAY'S VISIT:
69-year-old male, patient of Dr. Lugo, with hypothyroidism, asthma, CVA, BPH, hypertension, hyperlipidemia, POTS, RAHEEM, fatty liver, history of PUD, chronic diarrhea presents to the office for follow-up.  He was last seen in the office in February 2025 by Dr. Lugo.  He has a history of several weeks of dark stools in September 2024 and underwent EGD and colonoscopy in September 2024.  Results summarized below.  His PCP had started him on high-dose PPI and Carafate and his dark stools resolved after a few weeks.  His EGD showed grade 1 esophageal varices with no stigmata of bleeding.  CT enterography in 2017 demonstrated a normal-appearing liver and FibroScan in July 2023 showed no fibrosis. He denied a history of heavy alcohol use but did admit that he drank heavily while in college.  He reported having 3-4 loose stools per day which was down from 8-12 at his prior visits.  Stool studies were done in July 2024 which were negative for pathogens including C. difficile.  His O&P smear showed Blastocystis species.  Fecal calprotectin was borderline at 91.  Stool pancreatic elastase was normal at 635.  He was advised to follow a lactose-free diet and avoid NSAIDs.   Labs done through his PCP on January 6, 2025:WBC 5, hemoglobin 12.9, MCV 93.2, platelet count 200, AST 18, ALT 22, alkaline phosphatase 70, total bilirubin 0.7, sodium 141, BUN 6, creatinine 0.92, albumin 4.1, TSH 0.80, serum iron 122, transferrin saturation 31%, ferritin 13.  He follows up today after having MRI/MRCP 3/6/2025 demonstrated no evidence of hepatic steatosis.  No evidence of cirrhosis or suspicious liver lesions.  Cholelithiasis without cholecystitis.  2 cm cortical right renal cyst.  Several subcentimeter parapelvic cysts of both kidneys.  Normal pancreas.  He follows up today still reporting chronic diarrhea. He has diarrhea up to 12 times per day with urgency. On average, he has 6 episodes of diarrhea per day. He follows a lactose free diet. He has tried using kaopectate and pepto bismol. He is not currently having any melena or hematochezia but is worried about his low iron and wants a full GI investigation. He has been asymptomatic in regard to GERD on omeprazole 40mg daily and needs refils. He would like to see general surgery for gallstones. He has not had any RUQ pain with meals but states cholecystectomy was recommended to him a couple years ago.   Labs done 3/25/25 demonstrated a hgb 13.6, MCV 94.8, . B12 245, Folate 19.4, Serum iron21, Iron saturation 5%, Ferritin 27.

## 2025-04-03 ENCOUNTER — P2P PATIENT RECORD (OUTPATIENT)
Age: 70
End: 2025-04-03

## 2025-04-16 ENCOUNTER — TELEPHONE ENCOUNTER (OUTPATIENT)
Dept: URBAN - METROPOLITAN AREA CLINIC 63 | Facility: CLINIC | Age: 70
End: 2025-04-16

## 2025-04-23 ENCOUNTER — OFFICE VISIT (OUTPATIENT)
Dept: URBAN - METROPOLITAN AREA CLINIC 61 | Facility: CLINIC | Age: 70
End: 2025-04-23

## 2025-04-23 RX ORDER — AMINO ACIDS/MV,IRON,MIN
TABLET ORAL TWICE A DAY
Refills: 0 | COMMUNITY
Start: 2022-03-21

## 2025-04-23 RX ORDER — MULTIVITAMIN
TABLET ORAL ONCE A DAY
Refills: 0 | COMMUNITY
Start: 2022-03-21

## 2025-04-23 RX ORDER — ALBUTEROL SULFATE 90 UG/1
AEROSOL, METERED RESPIRATORY (INHALATION) AS NEEDED
Refills: 0 | COMMUNITY
Start: 2022-03-21

## 2025-04-23 RX ORDER — CLONIDINE 0.2 MG/24H
PATCH, EXTENDED RELEASE TRANSDERMAL
Refills: 0 | COMMUNITY
Start: 2022-03-21

## 2025-04-23 RX ORDER — OMEPRAZOLE 40 MG/1
1 CAPSULE 30 MINUTES BEFORE MORNING MEAL CAPSULE, DELAYED RELEASE ORAL ONCE A DAY
Qty: 90 | Refills: 3 | Status: ACTIVE | COMMUNITY
Start: 2023-06-13

## 2025-04-23 RX ORDER — DIMENHYDRINATE 50 MG
TABLET ORAL ONCE A DAY
Refills: 0 | COMMUNITY
Start: 2022-03-21

## 2025-04-23 RX ORDER — GALANTAMINE HYDROBROMIDE 24 MG/1
1 CAPSULE IN THE MORNING WITH FOOD CAPSULE, EXTENDED RELEASE ORAL ONCE A DAY
COMMUNITY

## 2025-04-23 RX ORDER — SUCRALFATE 1 G/1
1 TABLET ON AN EMPTY STOMACH TABLET ORAL
COMMUNITY

## 2025-04-23 RX ORDER — PREGABALIN 50 MG/1
1 CAPSULE CAPSULE ORAL TWICE A DAY
COMMUNITY

## 2025-04-23 RX ORDER — SODIUM CHLORIDE 1 G/1
TABLET ORAL ONCE A DAY
Refills: 0 | COMMUNITY
Start: 2022-03-21

## 2025-04-23 RX ORDER — ATENOLOL 25 MG/1
TAKE ONE TABLET BY MOUTH ONE TIME DAILY TABLET ORAL ONCE A DAY
Refills: 0 | COMMUNITY

## 2025-04-23 RX ORDER — LEVOTHYROXINE SODIUM 100 UG/1
TABLET ORAL ONCE A DAY
Refills: 0 | COMMUNITY
Start: 2022-03-21

## 2025-04-30 ENCOUNTER — OFFICE VISIT (OUTPATIENT)
Dept: URBAN - METROPOLITAN AREA CLINIC 63 | Facility: CLINIC | Age: 70
End: 2025-04-30

## 2025-05-08 ENCOUNTER — TELEPHONE ENCOUNTER (OUTPATIENT)
Dept: URBAN - METROPOLITAN AREA CLINIC 63 | Facility: CLINIC | Age: 70
End: 2025-05-08

## 2025-08-21 ENCOUNTER — P2P PATIENT RECORD (OUTPATIENT)
Age: 70
End: 2025-08-21